# Patient Record
Sex: MALE | Race: WHITE
[De-identification: names, ages, dates, MRNs, and addresses within clinical notes are randomized per-mention and may not be internally consistent; named-entity substitution may affect disease eponyms.]

---

## 2020-10-05 ENCOUNTER — HOSPITAL ENCOUNTER (OUTPATIENT)
Dept: HOSPITAL 11 - JP.SDS | Age: 85
Discharge: HOME | End: 2020-10-05
Attending: SURGERY
Payer: MEDICARE

## 2020-10-05 VITALS — HEART RATE: 59 BPM | DIASTOLIC BLOOD PRESSURE: 74 MMHG | SYSTOLIC BLOOD PRESSURE: 123 MMHG

## 2020-10-05 DIAGNOSIS — K21.9: ICD-10-CM

## 2020-10-05 DIAGNOSIS — E11.51: ICD-10-CM

## 2020-10-05 DIAGNOSIS — I11.0: ICD-10-CM

## 2020-10-05 DIAGNOSIS — I50.9: ICD-10-CM

## 2020-10-05 DIAGNOSIS — J44.9: ICD-10-CM

## 2020-10-05 DIAGNOSIS — K57.30: Primary | ICD-10-CM

## 2020-10-05 PROCEDURE — 45330 DIAGNOSTIC SIGMOIDOSCOPY: CPT

## 2020-10-05 NOTE — OR
DATE OF PROCEDURE:  10/05/2020

 

SURGEON:  Masoud Shields MD

 

PROCEDURE:  Colonoscopy.

 

FINDINGS:

1. Consistent with previous colonoscopy performed by Dr. Flaherty (unable to advance the

    scope past 30 cm).

2. Diverticulosis.

3. No significant amount of blood.

4. Most likely etiology of bleeding at this juncture would be diverticulosis; however, the

    patient will be sent for a barium enema.

 

COMPLICATIONS:  None.

 

ASSISTANT:  None.

 

ANESTHESIA:  MAC.

 

RISKS:  Risks, benefits, alternatives, and limitations including, but not limited to

infection, bleeding, perforation, and cardiovascular risks, along with false positives and

false negatives, were explained to the patient, who wished to proceed.

 

PROCEDURE IN DETAIL:  The patient was placed in left lateral decubitus position.  Digital

rectal exam was performed without abnormality.  Scope was introduced and advanced

atraumatically to approximately 30 cm.  This was consistent with previous operative report

with the tortuosity at this location.  The scope was never blindly advanced, and the

procedure was terminated at this time.  The scope was brought back through the colon and

retroflexed.  There was no significant blood.  The patient did have diverticulosis, which

would be described as extensive, and no abnormalities on retroflexion.  The patient

tolerated the procedure well.

 

 

 

 

Masoud Shields MD

DD:  10/05/2020 08:26:17

DT:  10/05/2020 14:45:03

Job #:  [id number]

## 2020-10-29 ENCOUNTER — HOSPITAL ENCOUNTER (EMERGENCY)
Dept: HOSPITAL 11 - JP.ED | Age: 85
Discharge: NURSING, REHAB OR LONG-TERM CARE | End: 2020-10-29
Payer: MEDICARE

## 2020-10-29 VITALS — SYSTOLIC BLOOD PRESSURE: 121 MMHG | DIASTOLIC BLOOD PRESSURE: 55 MMHG | HEART RATE: 89 BPM

## 2020-10-29 DIAGNOSIS — Z86.73: ICD-10-CM

## 2020-10-29 DIAGNOSIS — M19.90: ICD-10-CM

## 2020-10-29 DIAGNOSIS — E78.00: ICD-10-CM

## 2020-10-29 DIAGNOSIS — E87.1: ICD-10-CM

## 2020-10-29 DIAGNOSIS — Z79.899: ICD-10-CM

## 2020-10-29 DIAGNOSIS — K80.50: Primary | ICD-10-CM

## 2020-10-29 DIAGNOSIS — I25.10: ICD-10-CM

## 2020-10-29 DIAGNOSIS — R79.89: ICD-10-CM

## 2020-10-29 DIAGNOSIS — Z88.1: ICD-10-CM

## 2020-10-29 DIAGNOSIS — J44.9: ICD-10-CM

## 2020-10-29 DIAGNOSIS — Z79.82: ICD-10-CM

## 2020-10-29 DIAGNOSIS — Z79.01: ICD-10-CM

## 2020-10-29 DIAGNOSIS — Z87.891: ICD-10-CM

## 2020-10-29 DIAGNOSIS — Z79.84: ICD-10-CM

## 2020-10-29 DIAGNOSIS — I50.9: ICD-10-CM

## 2020-10-29 DIAGNOSIS — Z88.8: ICD-10-CM

## 2020-10-29 DIAGNOSIS — E11.51: ICD-10-CM

## 2020-10-29 DIAGNOSIS — I11.0: ICD-10-CM

## 2020-10-29 PROCEDURE — 96365 THER/PROPH/DIAG IV INF INIT: CPT

## 2020-10-29 PROCEDURE — 96366 THER/PROPH/DIAG IV INF ADDON: CPT

## 2020-10-29 PROCEDURE — 74019 RADEX ABDOMEN 2 VIEWS: CPT

## 2020-10-29 PROCEDURE — 99285 EMERGENCY DEPT VISIT HI MDM: CPT

## 2020-10-29 PROCEDURE — 83690 ASSAY OF LIPASE: CPT

## 2020-10-29 PROCEDURE — 74176 CT ABD & PELVIS W/O CONTRAST: CPT

## 2020-10-29 PROCEDURE — 80053 COMPREHEN METABOLIC PANEL: CPT

## 2020-10-29 PROCEDURE — 36415 COLL VENOUS BLD VENIPUNCTURE: CPT

## 2020-10-29 PROCEDURE — 85027 COMPLETE CBC AUTOMATED: CPT

## 2020-10-29 NOTE — CRLCT
INDICATION: Upper abdominal pain, vomiting, elevated liver function test



TECHNIQUE: CT Abdomen and pelvis without i.v. contrast. Coronal and 

sagittal reformats were obtained.



COMPARISON: 05/06/2015



FINDINGS: 



Lower chest: Unremarkable. 



Liver: Unremarkable. 



Spleen: Unremarkable. 



Pancreas: Unremarkable. 



Gallbladder: Unremarkable. There is high-density material present within 

the distal CBD that measures 11 mm. 



Kidney: There is a cyst in the upper pole of the left kidney measuring 2.4 

cm without interval change. 



Adrenal: Unremarkable. 



Bowel: Severe diverticulosis of the descending and sigmoid colon are 

present with retention of high density oral contrast. Moderate amount of 

stool is present throughout the colon which may be due to chronic 

constipation. The appendix is normal in appearance and size. 



Vascular: A small abdominal aortic aneurysm is present measuring 3 cm and 

slightly increased from prior examination where it measured 2.7 cm. Severe 

diffuse atherosclerotic calcifications of the abdominal aorta and its 

tributaries are present. 



Lymph: Unremarkable. 



Peritoneum: Unremarkable. No pneumoperitoneum is seen. No significant 

ascites is noted. 



Pelvis: Evaluation of the pelvic soft tissues, distal ureters and osseous 

structures are limited by beam hardening artifacts from the right anatomy 

compression screw. 



Soft tissue: Unremarkable. 



Bone: Unremarkable for age. 



IMPRESSIONS: 



1. A small abdominal aortic aneurysm is present measuring 3 cm and slightly 

increased from prior examination where it measured 2.7 cm.



2. There is high-density material present within the distal CBD that 

measures 11 mm. Findings are suspicious for choledocholithiasis.



Dictated by Dain Couch MD @ 10/29/2020 4:31:15 AM



Please note that all CT scans at this facility use dose modulation, 

iterative reconstruction, and/or weight-based dosing when appropriate to 

reduce radiation dose to as low as reasonably achievable.



Dictated by: Dain Couch MD @ 10/29/2020 04:31:34



(Electronically Signed)

## 2020-10-29 NOTE — CR
Abdomen 2V AP Flat Upright

 

CLINICAL HISTORY: Upper abdominal pain

 

FINDINGS: No free air is identified. Small bowel configuration is nonacute.

There is some mild prominence of the rugal folds. There is gas and feces

throughout the colon. There is some retained barium in numerous colonic

diverticula

 

IMPRESSION: Nonacute intestinal gas pattern

 

Mild prominence of the rugal folds of the stomach. This can be seen with

gastritis. Clinical correlation necessary

## 2020-10-29 NOTE — EDM.PDOC
ED HPI GENERAL MEDICAL PROBLEM





- General


Chief Complaint: Gastrointestinal Problem


Stated Complaint: MEDICAL VIA [facility]


Time Seen by Provider: 10/29/20 02:40


Source of Information: Reports: Patient, Old Records, RN


History Limitations: Reports: No Limitations





- History of Present Illness


INITIAL COMMENTS - FREE TEXT/NARRATIVE: 





84 yo male here via EMS after he developed progressive abdominal pain tonight 

starting about 9 pm. He vomited about 500 ml of gastric contents upon arrival 

and achieved considerable relief with this. Before 9 am he was feeling quite 

well. His emesis material did not look like blood. 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 


Onset Date: 10/28/20


Onset Time: 21:00


Duration: Hour(s):, Other (was getting progressively worse until he vomited, now

sx's only partially present. )


Location: Reports: Abdomen


Quality: Reports: Pressure


Severity: Mild (now, was severe before vomiting)


Improves with: Reports: Other (vomiting)


Worsens with: Reports: Other (unknown)


Context: Reports: Other (See HPI)


Associated Symptoms: Reports: Nausea/Vomiting.  Denies: Fever/Chills


Treatments PTA: Reports: See EMS Report


  ** Upper Abdomen


Pain Score (Numeric/FACES): 6





- Related Data


                                    Allergies











Allergy/AdvReac Type Severity Reaction Status Date / Time


 


lisinopril Allergy  Swelling Verified 10/29/20 02:34


 


naproxen Allergy  Other Verified 10/29/20 02:34


 


naproxen sodium [From Aleve] Allergy  Cannot Verified 10/29/20 02:34





   Remember  











Home Meds: 


                                    Home Meds





Aspirin 81 mg PO DAILY 05/19/14 [History]


Calcium Carbonate 1,500 mg PO BIDM 05/19/14 [History]


Cholecalciferol (Vitamin D3) [Vitamin D3] 2,000 unit PO BID 05/19/14 [History]


Fluticasone Propionate [Flonase] 2 sprays [employer] DAILY 05/19/14 [History]


Metoprolol Succinate [Toprol XL] 25 mg PO DAILY 05/19/14 [History]


Omeprazole [Prilosec] 20 mg PO BID 05/19/14 [History]


atorvaSTATin [Lipitor] 20 mg PO BEDTIME 05/19/14 [History]


metFORMIN [Glucophage] 500 mg PO BIDM 05/19/14 [History]


Isosorbide Mononitrate [Imdur] 60 mg PO DAILY 07/23/18 [History]


amLODIPine Besylate [Norvasc] 5 mg PO DAILY 07/23/18 [History]


hydrALAZINE [Apresoline] 50 mg PO TID 07/23/18 [History]


Albuterol Sulfate [Proair Hfa] 2 puff IH Q6HR PRN 10/02/20 [History]


Iron 65 mg PO BID 10/02/20 [History]


Omega-3S/DHA/Epa/Fish Oil [Omega-3 Fish Oil 1,200 mg Sfgl] 1 cap PO DAILY 

10/02/20 [History]


Sotalol [Betapace, Sorine] 40 mg PO BID 10/02/20 [History]


traMADol [Ultram] 50 mg PO Q8HR PRN 10/02/20 [History]


Apixaban [Eliquis] 5 mg PO BID 10/05/20 [History]











Past Medical History


HEENT History: Reports: Cataract, Hard of Hearing


Cardiovascular History: Reports: Arrhythmia, CAD, Heart Failure, High 

Cholesterol, Hypertension, PVD, SOB on Exertion


Respiratory History: Reports: COPD


Gastrointestinal History: Reports: GERD


Genitourinary History: Reports: Prostate Disorder


Musculoskeletal History: Reports: Arthritis


Neurological History: Reports: TIA


Psychiatric History: Reports: Depression


Endocrine/Metabolic History: Reports: Diabetes, Type II


Hematologic History: Reports: Iron Deficiency


Oncologic (Cancer) History: Reports: Prostate


Dermatologic History: Reports: Other (See Below)


Other Dermatologic History: minor skin cancer behind right ear





- Infectious Disease History


Infectious Disease History: Reports: Chicken Pox, Measles, Mumps


Other Infectious Disease History: history of ecoli





- Past Surgical History


HEENT Surgical History: Reports: Detached Retina


Cardiovascular Surgical History: Reports: None


Respiratory Surgical History: Reports: None


GI Surgical History: Reports: Colonoscopy, EGD, Hernia, Abdominal, Hernia, 

Inguinal


Male  Surgical History: Reports: Prostate Biopsy, Other (See Below)


Other Male  Surgeries/Procedures: 7 days of radiation


Endocrine Surgical History: Reports: None


Neurological Surgical History: Reports: None


Musculoskeletal Surgical History: Reports: Other (See Below)


Other Musculoskeletal Surgeries/Procedures:: Right hip surgery in 2018 repair 

fracture after fall


Oncologic Surgical History: Reports: None





Social & Family History





- Family History


Family Medical History: Noncontributory





- Tobacco Use


Tobacco Use Status *Q: Former Tobacco User


Used Tobacco, but Quit: Yes


Month/Year Tobacco Last Used: 2015





- Caffeine Use


Caffeine Use: Reports: Coffee


Caffeine Use Comment: 2 cups per day





- Alcohol Use


Days Per Week of Alcohol Use: 7


Number of Drinks Per Day: 2


Total Drinks Per Week: 14





- Recreational Drug Use


Recreational Drug Use: No





ED ROS GENERAL





- Review of Systems


Review Of Systems: See Below


Constitutional: Reports: No Symptoms


HEENT: Reports: No Symptoms


Respiratory: Reports: No Symptoms


Cardiovascular: Reports: No Symptoms


GI/Abdominal: Reports: Abdominal Pain (mild upper pain persists), Nausea (upon 

arrival now gone. ), Vomiting (one large emesis of about 500 ml)


: Reports: No Symptoms


Musculoskeletal: Reports: No Symptoms


Skin: Reports: No Symptoms


Neurological: Reports: No Symptoms





ED EXAM, GI/ABD





- Physical Exam


Exam: See Below


Exam Limited By: No Limitations


General Appearance: Alert, WD/WN, No Apparent Distress, Thin


Eyes: Bilateral: Normal Appearance


Ears: Normal External Exam, Normal Canal, Hearing Grossly Normal


Nose: Normal Inspection, No Blood


Throat/Mouth: Normal Inspection, Normal Lips, Normal Oropharynx, Normal Voice, 

No Airway Compromise


Head: Atraumatic, Normocephalic


Neck: Normal Inspection


Respiratory/Chest: No Respiratory Distress, Lungs Clear, Normal Breath Sounds, 

No Accessory Muscle Use


Cardiovascular: Regular Rate, Rhythm, No Edema


GI/Abdominal Exam: Normal Bowel Sounds, Soft, No Distention, Tender (mild RUQ, 

epigastric and LUQ abdominal pain).  No: Non-Tender, Distended


Back Exam: Normal Inspection.  No: CVA Tenderness (R), CVA Tenderness (L)


Extremities: Normal Inspection, Normal Range of Motion, Non-Tender, No Pedal 

Edema


Neurological: Alert, Oriented, CN II-XII Intact, Normal Cognition, No 

Motor/Sensory Deficits


Psychiatric: Normal Affect, Normal Mood


Skin Exam: Warm, Dry, Intact, Normal Color, No Rash





Course





- Vital Signs


Text/Narrative:: 





accepted by Dr. Zurita @ [facility] @ 0640


Last Recorded V/S: 


                                Last Vital Signs











Temp  35.6 C L  10/29/20 02:30


 


Pulse  89   10/29/20 06:17


 


Resp  16   10/29/20 06:17


 


BP  121/55 L  10/29/20 06:17


 


Pulse Ox  95   10/29/20 06:17














- Orders/Labs/Meds


Orders: 


                               Active Orders 24 hr











 Category Date Time Status


 


 Abdomen 2V AP Flat Upright [CR] Stat Exams  10/29/20 02:48 Taken


 


 NS + KCl 20mEq/L [Normal Saline with 20 mEq KCl] 1,000 Med  10/29/20 03:45 

Active





 ml   





 IV ASDIRECTED   


 


 Sodium Chloride 0.9% [Saline Flush] Med  10/29/20 02:48 Active





 10 ml FLUSH ASDIRECTED PRN   


 


 Saline Lock Insert [OM.PC] Routine Oth  10/29/20 02:48 Ordered








                                Medication Orders





Potassium Chloride/Sodium Chloride (Normal Saline With 20 Meq Kcl)  1,000 mls @ 

500 mls/hr IV ASDIRECTED ZELALEM


   Last Admin: 10/29/20 03:59  Dose: 500 mls/hr


   Documented by: LACHELLE


Sodium Chloride (Saline Flush)  10 ml FLUSH ASDIRECTED PRN


   PRN Reason: Keep Vein Open


   Last Admin: 10/29/20 03:59  Dose: 10 ml


   Documented by: LACHELLE








Labs: 


                                Laboratory Tests











  10/29/20 10/29/20 Range/Units





  02:48 03:00 


 


WBC  10.2   (4.5-11.0)  K/uL


 


RBC  4.39   (4.30-5.90)  M/uL


 


Hgb  12.9   (12.0-15.0)  g/dL


 


Hct  39.4 L   (40.0-54.0)  %


 


MCV  90   (80-98)  fL


 


MCH  29   (27-31)  pg


 


MCHC  33   (32-36)  %


 


Plt Count  210   (150-400)  K/uL


 


Sodium   125 L  (140-148)  mmol/L


 


Potassium   3.5 L  (3.6-5.2)  mmol/L


 


Chloride   90 L  (100-108)  mmol/L


 


Carbon Dioxide   26  (21-32)  mmol/L


 


Anion Gap   12.5  (5.0-14.0)  mmol/L


 


BUN   17  (7-18)  mg/dL


 


Creatinine   1.0  D  (0.8-1.3)  mg/dL


 


Est Cr Clr Drug Dosing   45.39  mL/min


 


Estimated GFR (MDRD)   > 60  (>60)  


 


Glucose   178 H  ()  mg/dL


 


Calcium   9.1  D  (8.5-10.1)  mg/dL


 


Total Bilirubin   1.5 H D  (0.2-1.0)  mg/dL


 


AST   625 H D  (15-37)  U/L


 


ALT   305 H  (12-78)  U/L


 


Alkaline Phosphatase   117 H  ()  U/L


 


Total Protein   6.9  (6.4-8.2)  g/dL


 


Albumin   3.8  (3.4-5.0)  g/dL


 


Globulin   3.1  (2.3-3.5)  g/dL


 


Albumin/Globulin Ratio   1.2  (1.2-2.2)  


 


Lipase   103  ()  U/L











Meds: 


Medications











Generic Name Dose Route Start Last Admin





  Trade Name Freq  PRN Reason Stop Dose Admin


 


Potassium Chloride/Sodium Chloride  1,000 mls @ 500 mls/hr  10/29/20 03:45  

10/29/20 03:59





  Normal Saline With 20 Meq Kcl  IV   500 mls/hr





  ASDIRECTED ZELALEM   Administration


 


Sodium Chloride  10 ml  10/29/20 02:48  10/29/20 03:59





  Saline Flush  FLUSH   10 ml





  ASDIRECTED PRN   Administration





  Keep Vein Open  














Discontinued Medications














Generic Name Dose Route Start Last Admin





  Trade Name Freq  PRN Reason Stop Dose Admin


 


Sodium Chloride  70 mls @ 2.5 mls/sec  10/29/20 03:51 





  Normal Saline  IV  10/29/20 03:52 





  ASDIRECTED STA  


 


Iopamidol  100 ml  10/29/20 03:51 





  Isovue-300 (61%)  IV  10/29/20 03:52 





  .AS DIRECTED STA  














- Radiology Interpretation


Free Text/Narrative:: 





Flat/upright abdominal X-rays-neg





CT abd/pelvis-


CT Results Date: 10/29/20





Departure





- Departure


Time of Disposition: 07:00


Disposition: DC/Tfer to Acute Hospital [facility]


Condition: Fair


Clinical Impression: 


 Choledocholithiasis, Hyponatremia, Elevated LFTs








- Discharge Information


*PRESCRIPTION DRUG MONITORING PROGRAM REVIEWED*: No


*COPY OF PRESCRIPTION DRUG MONITORING REPORT IN PATIENT VANNESA: No


Referrals: 


PCP,None [Primary Care Provider] - 


Forms:  ED Department Discharge





Sepsis Event Note (ED)





- Evaluation


Sepsis Screening Result: No Definite Risk





- Focused Exam


Vital Signs: 


                                   Vital Signs











  Temp Pulse Resp BP Pulse Ox


 


 10/29/20 06:17   89  16  121/55 L  95


 


 10/29/20 05:17   83   124/50 L 


 


 10/29/20 04:18   80  16  138/52 L  95


 


 10/29/20 02:30  35.6 C L  70  18  150/63 H  88 L














- My Orders


Last 24 Hours: 


My Active Orders





10/29/20 02:48


Abdomen 2V AP Flat Upright [CR] Stat 


Sodium Chloride 0.9% [Saline Flush]   10 ml FLUSH ASDIRECTED PRN 


Saline Lock Insert [OM.PC] Routine 





10/29/20 03:45


NS + KCl 20mEq/L [Normal Saline with 20 mEq KCl] 1,000 ml IV ASDIRECTED 














- Assessment/Plan


Last 24 Hours: 


My Active Orders





10/29/20 02:48


Abdomen 2V AP Flat Upright [CR] Stat 


Sodium Chloride 0.9% [Saline Flush]   10 ml FLUSH ASDIRECTED PRN 


Saline Lock Insert [OM.PC] Routine 





10/29/20 03:45


NS + KCl 20mEq/L [Normal Saline with 20 mEq KCl] 1,000 ml IV ASDIRECTED

## 2021-04-12 ENCOUNTER — HOSPITAL ENCOUNTER (EMERGENCY)
Dept: HOSPITAL 11 - JP.ED | Age: 86
Discharge: HOME | End: 2021-04-12
Payer: MEDICARE

## 2021-04-12 VITALS — DIASTOLIC BLOOD PRESSURE: 104 MMHG | HEART RATE: 78 BPM | SYSTOLIC BLOOD PRESSURE: 170 MMHG

## 2021-04-12 DIAGNOSIS — I50.9: ICD-10-CM

## 2021-04-12 DIAGNOSIS — Z79.01: ICD-10-CM

## 2021-04-12 DIAGNOSIS — Z87.891: ICD-10-CM

## 2021-04-12 DIAGNOSIS — Z86.73: ICD-10-CM

## 2021-04-12 DIAGNOSIS — I11.0: ICD-10-CM

## 2021-04-12 DIAGNOSIS — K21.9: ICD-10-CM

## 2021-04-12 DIAGNOSIS — E78.5: ICD-10-CM

## 2021-04-12 DIAGNOSIS — Z79.899: ICD-10-CM

## 2021-04-12 DIAGNOSIS — I25.10: ICD-10-CM

## 2021-04-12 DIAGNOSIS — J44.9: ICD-10-CM

## 2021-04-12 DIAGNOSIS — D72.829: Primary | ICD-10-CM

## 2021-04-12 DIAGNOSIS — E11.9: ICD-10-CM

## 2021-04-12 DIAGNOSIS — R79.89: ICD-10-CM

## 2021-04-12 DIAGNOSIS — I48.91: ICD-10-CM

## 2021-04-12 DIAGNOSIS — E11.51: ICD-10-CM

## 2021-04-12 PROCEDURE — 85025 COMPLETE CBC W/AUTO DIFF WBC: CPT

## 2021-04-12 PROCEDURE — 80053 COMPREHEN METABOLIC PANEL: CPT

## 2021-04-12 PROCEDURE — 99284 EMERGENCY DEPT VISIT MOD MDM: CPT

## 2021-04-12 PROCEDURE — 36415 COLL VENOUS BLD VENIPUNCTURE: CPT

## 2021-04-12 PROCEDURE — 81001 URINALYSIS AUTO W/SCOPE: CPT

## 2021-04-12 PROCEDURE — 74177 CT ABD & PELVIS W/CONTRAST: CPT

## 2021-04-12 PROCEDURE — 82150 ASSAY OF AMYLASE: CPT

## 2021-04-12 PROCEDURE — 83690 ASSAY OF LIPASE: CPT

## 2021-04-12 PROCEDURE — 83735 ASSAY OF MAGNESIUM: CPT

## 2021-04-12 NOTE — EDM.PDOC
ED HPI GENERAL MEDICAL PROBLEM





- General


Chief Complaint: Gastrointestinal Problem


Stated Complaint: STOMACH PAIN


Time Seen by Provider: 04/12/21 18:13


Source of Information: Reports: Patient


History Limitations: Reports: No Limitations





- History of Present Illness


INITIAL COMMENTS - FREE TEXT/NARRATIVE: 





Patient presents to the ER due to ongoing concern about abdominal discomfort 

that occurrs mainly after lunchtime meal.  he states he eats variable foods and 

has not associated pain/discomfort with any specific food.  he states its been 

going on for several weeks (at least since mid-March).  He states no problems 

after dinner and rare occurrance after breakfast.  he states he has not seen 

anyone for this--just returned from spending the winter in [place] (back to 

local area on Friday).  He has not taken any OTC meds such as tums, pepto, MOM 

or other products for upset stomach.  He states he has chronic loose 

stools--these have somewhat improved since he was taken off his metformin.  

denies any N/V, fever/chills, lightheaded/dizzy etc.





--has appointment with PCM scheduled in 3 weeks; did not seek care while in 

[place]





COVID + 23 Jan (asymptomatic); has not recieved COVID immunization yet





PMH--HTN, DM2, HLP, A-fib, COPD, hx prostate ca (2012)


Meds--EMR reviewed as patient states he does not know


Allergies--EMR reviewed as patient states he does not know





Tob--former (quit 7 years ago)


EtOH--glass of wine nightly


Drugs--denies


  ** Abdomen


Pain Score (Numeric/FACES): 2





- Related Data


                                    Allergies











Allergy/AdvReac Type Severity Reaction Status Date / Time


 


naproxen Allergy Unknown Other Verified 04/12/21 17:32


 


naproxen sodium [From Aleve] Allergy Unknown Cannot Verified 04/12/21 17:32





   Remember  


 


lisinopril Allergy  Swelling Verified 04/12/21 17:32











Home Meds: 


                                    Home Meds





Aspirin 81 mg PO DAILY 05/19/14 [History]


Calcium Carbonate 1,500 mg PO BIDM 05/19/14 [History]


Cholecalciferol (Vitamin D3) [Vitamin D3] 2,000 unit PO BID 05/19/14 [History]


Fluticasone Propionate [Flonase] 2 sprays [employer] DAILY 05/19/14 [History]


Metoprolol Succinate [Toprol XL] 25 mg PO DAILY 05/19/14 [History]


Omeprazole [Prilosec] 20 mg PO BID 05/19/14 [History]


atorvaSTATin [Lipitor] 20 mg PO BEDTIME 05/19/14 [History]


Isosorbide Mononitrate [Imdur] 60 mg PO DAILY 07/23/18 [History]


amLODIPine Besylate [Norvasc] 5 mg PO DAILY 07/23/18 [History]


hydrALAZINE [Apresoline] 50 mg PO TID 07/23/18 [History]


Albuterol Sulfate [Proair Hfa] 2 puff IH Q6HR PRN 10/02/20 [History]


Iron 65 mg PO BID 10/02/20 [History]


Sotalol [Betapace, Sorine] 40 mg PO BID 10/02/20 [History]


traMADol [Ultram] 50 mg PO Q8HR PRN 10/02/20 [History]


Apixaban [Eliquis] 5 mg PO BID 10/05/20 [History]











Past Medical History


HEENT History: Reports: Cataract, Hard of Hearing


Cardiovascular History: Reports: Arrhythmia, CAD, Heart Failure, High 

Cholesterol, Hypertension, PVD, SOB on Exertion


Respiratory History: Reports: COPD


Gastrointestinal History: Reports: GERD


Genitourinary History: Reports: Prostate Disorder


Musculoskeletal History: Reports: Arthritis


Neurological History: Reports: TIA


Psychiatric History: Reports: Depression


Endocrine/Metabolic History: Reports: Diabetes, Type II


Hematologic History: Reports: Iron Deficiency


Oncologic (Cancer) History: Reports: Prostate


Dermatologic History: Reports: Other (See Below)


Other Dermatologic History: minor skin cancer behind right ear





- Infectious Disease History


Infectious Disease History: Reports: Chicken Pox, Measles, Mumps


Other Infectious Disease History: history of ecoli





- Past Surgical History


HEENT Surgical History: Reports: Detached Retina


Cardiovascular Surgical History: Reports: None


Respiratory Surgical History: Reports: None


GI Surgical History: Reports: Colonoscopy, EGD, Hernia, Abdominal, Hernia, 

Inguinal


Male  Surgical History: Reports: Prostate Biopsy, Other (See Below)


Other Male  Surgeries/Procedures: 7 days of radiation


Endocrine Surgical History: Reports: None


Neurological Surgical History: Reports: None


Musculoskeletal Surgical History: Reports: Other (See Below)


Other Musculoskeletal Surgeries/Procedures:: Right hip surgery in 2018 repair 

fracture after fall


Oncologic Surgical History: Reports: None





Social & Family History





- Family History


Family Medical History: No Pertinent Family History





- Tobacco Use


Tobacco Use Status *Q: Former Tobacco User


Years of Tobacco use: 55


Packs/Tins Daily: 1


Used Tobacco, but Quit: Yes


Month/Year Tobacco Last Used: Jan 2011


Second Hand Smoke Exposure: No





- Caffeine Use


Caffeine Use: Reports: Coffee


Other Caffeine Use: 2 cups daily


Caffeine Use Comment: 2 cups per day





- Alcohol Use


Days Per Week of Alcohol Use: 7


Number of Drinks Per Day: 1


Total Drinks Per Week: 7


Date of Last Drink: 04/11/21





- Recreational Drug Use


Recreational Drug Use: No





ED ROS GENERAL





- Review of Systems


Review Of Systems: See Below


Constitutional: Reports: No Symptoms.  Denies: Fever, Chills, Weakness, Fatigue,

Decreased Appetite, Weight Loss


HEENT: Reports: No Symptoms


Respiratory: Reports: No Symptoms


Cardiovascular: Reports: No Symptoms


Endocrine: Reports: No Symptoms


GI/Abdominal: Reports: Abdominal Pain, Diarrhea.  Denies: Constipation, 

Decreased Appetite, Difficulty Swallowing, Nausea, Vomiting


: Reports: No Symptoms


Musculoskeletal: Reports: No Symptoms


Skin: Reports: No Symptoms


Neurological: Reports: No Symptoms


Psychiatric: Reports: No Symptoms


Hematologic/Lymphatic: Reports: No Symptoms


Immunologic: Reports: No Symptoms





ED EXAM, GI/ABD





- Physical Exam


Exam: See Below


Exam Limited By: No Limitations


General Appearance: Alert, WD/WN, No Apparent Distress


Eyes: Bilateral: Normal Appearance, EOMI


Ears: Normal External Exam


Nose: Normal Inspection


Throat/Mouth: Normal Inspection, Normal Lips, Normal Oropharynx, Normal Voice


Head: Atraumatic, Normocephalic


Neck: Normal Inspection, Supple, Non-Tender, Full Range of Motion.  No: L

ymphadenopathy (R), Lymphadenopathy (L)


Respiratory/Chest: No Respiratory Distress, Lungs Clear, Normal Breath Sounds, 

No Accessory Muscle Use


Cardiovascular: Normal Peripheral Pulses, Regular Rate, Rhythm, No Murmur.  No: 

No Edema


GI/Abdominal Exam: Normal Bowel Sounds, Soft, Tender (mild tenderness--he states

primarily left sided).  No: No Distention, Distended, Guarding, Rigid, Rebound


 (Male) Exam: Deferred


Rectal (Males) Exam: Deferred


Back Exam: Full Range of Motion (mild kyphosis)


Extremities: Normal Inspection, Normal Range of Motion, No Pedal Edema, Normal 

Capillary Refill


Neurological: Alert, Oriented, Normal Cognition


Psychiatric: Normal Affect, Normal Mood


Skin Exam: Warm, Dry, Intact, Normal Color





Course





- Vital Signs


Text/Narrative:: 





2055--reviewed labs, noted for elevated WBC-15, 87%; LFTs--AST/ALT-517/290, ALP-

286, T.bili-1.7; normal amylase/lipase-38/84. mild hypomag-1.6.  d/w patient 

abnormal findings and next step is to obtain CT abd/pelvis for further 

evaluation.  verbalized understanding/agreement





2325--CT abd/pelvis report has returned, no acute findings; review of EMR noted 

for elevated LFTs previoulsy.  UA unremarkable.  no explanation for today's 

complaints nor for mild leukocytosis.  will treat with oral magnesium x 1 and 

have patient contact PCM for ER follow up in the next 3-5 days.  due to 

abdominal pain after noon meal will start protonix to see if any change in 

symptoms that can be followed up with PCM.  patient verbalized u

nderstanding/agreement with plan of care.  ready for d/c


Last Recorded V/S: 


                                Last Vital Signs











Temp  97 F   04/12/21 17:29


 


Pulse  78   04/12/21 20:56


 


Resp  16   04/12/21 20:56


 


BP  170/104 H  04/12/21 20:56


 


Pulse Ox  96   04/12/21 20:56














- Orders/Labs/Meds


Orders: 


                               Active Orders 24 hr











 Category Date Time Status


 


 Iopamidol [Isovue-300 (61%)] Med  04/12/21 21:15 Active





 100 ml IV .AS DIRECTED   


 


 Sodium Chloride 0.9% [Normal Saline] 75 ml Med  04/12/21 21:15 Active





 IV ASDIRECTED   


 


 Sodium Chloride 0.9% [Saline Flush] Med  04/12/21 20:53 Active





 10 ml FLUSH ASDIRECTED PRN   


 


 ED Antiemetic Medication Reflex [OM.PC] Click to Edit Oth  04/12/21 18:37 

Ordered


 


 ED GI Medications Reflex [OM.PC] Click to Edit Oth  04/12/21 18:37 Ordered


 


 Saline Lock Insert [OM.PC] Routine Oth  04/12/21 20:53 Ordered








                                Medication Orders





Sodium Chloride (Normal Saline)  75 mls @ 3 mls/sec IV ASDIRECTED ZELALEM


   Last Admin: 04/12/21 21:50  Dose: 3 mls/sec


   Documented by: THELASH


Iopamidol (Iopamidol 612 Mg/Ml 100 Ml Bottle)  100 ml IV .AS DIRECTED ZELALEM


   Last Admin: 04/12/21 21:50  Dose: 88 ml


   Documented by: THELASH


Sodium Chloride (Sodium Chloride 0.9% 10 Ml Syringe)  10 ml FLUSH ASDIRECTED PRN


   PRN Reason: Keep Vein Open


   Last Admin: 04/12/21 21:07  Dose: 10 ml


   Documented by: PREILOR








Labs: 


                                Laboratory Tests











  04/12/21 04/12/21 04/12/21 Range/Units





  18:51 18:51 20:56 


 


WBC  15.0 H    (4.5-11.0)  K/uL


 


RBC  4.39    (4.30-5.90)  M/uL


 


Hgb  13.1    (12.0-15.0)  g/dL


 


Hct  39.1 L    (40.0-54.0)  %


 


MCV  89    (80-98)  fL


 


MCH  30    (27-31)  pg


 


MCHC  34    (32-36)  %


 


Plt Count  248    (150-400)  K/uL


 


Neut % (Auto)  87 H    (36-66)  %


 


Lymph % (Auto)  3 L    (24-44)  %


 


Mono % (Auto)  10 H    (2-6)  %


 


Eos % (Auto)  0 L    (2-4)  %


 


Baso % (Auto)  0    (0-1)  %


 


Sodium   139 L   (140-148)  mmol/L


 


Potassium   4.1   (3.6-5.2)  mmol/L


 


Chloride   100   (100-108)  mmol/L


 


Carbon Dioxide   29   (21-32)  mmol/L


 


Anion Gap   14.1 H   (5.0-14.0)  mmol/L


 


BUN   14   (7-18)  mg/dL


 


Creatinine   0.9   (0.8-1.3)  mg/dL


 


Est Cr Clr Drug Dosing   52.98   mL/min


 


Estimated GFR (MDRD)   > 60   (>60)  


 


Glucose   169 H   ()  mg/dL


 


Calcium   9.0   (8.5-10.1)  mg/dL


 


Magnesium   1.6 L   (1.8-2.4)  mg/dL


 


Total Bilirubin   1.7 H   (0.2-1.0)  mg/dL


 


AST   517 H   (15-37)  U/L


 


ALT   290 H   (12-78)  U/L


 


Alkaline Phosphatase   286 H D   ()  U/L


 


Total Protein   6.5   (6.4-8.2)  g/dL


 


Albumin   3.3 L   (3.4-5.0)  g/dL


 


Globulin   3.2   (2.3-3.5)  g/dL


 


Albumin/Globulin Ratio   1.0 L   (1.2-2.2)  


 


Amylase   38   ()  U/L


 


Lipase   84   ()  U/L


 


Urine Color    Yellow  (YELLOW)  


 


Urine Appearance    Clear  (CLEAR)  


 


Urine pH    6.5  (5.0-8.0)  


 


Ur Specific Gravity    1.025  (1.008-1.030)  


 


Urine Protein    Negative  (NEGATIVE)  mg/dL


 


Urine Glucose (UA)    100 H  (NEGATIVE)  mg/dL


 


Urine Ketones    Negative  (NEGATIVE)  mg/dL


 


Urine Occult Blood    Negative  (NEGATIVE)  


 


Urine Nitrite    Negative  (NEGATIVE)  


 


Urine Bilirubin    Small H  (NEGATIVE)  


 


Urine Urobilinogen    4.0 H  (0.2-1.0)  EU/dL


 


Ur Leukocyte Esterase    Negative  (NEGATIVE)  


 


Urine RBC    0-5  (0-5)  


 


Urine WBC    0-5  (0-5)  


 


Ur Epithelial Cells    Not seen  


 


Amorphous Sediment    Not seen  


 


Urine Bacteria    Not seen  


 


Urine Mucus    Not seen  











Meds: 


Medications











Generic Name Dose Route Start Last Admin





  Trade Name Freq  PRN Reason Stop Dose Admin


 


Sodium Chloride  75 mls @ 3 mls/sec  04/12/21 21:15  04/12/21 21:50





  Normal Saline  IV   3 mls/sec





  ASDIRECTED ZELALEM   Administration


 


Iopamidol  100 ml  04/12/21 21:15  04/12/21 21:50





  Iopamidol 612 Mg/Ml 100 Ml Bottle  IV   88 ml





  .AS DIRECTED ZELALEM   Administration


 


Sodium Chloride  10 ml  04/12/21 20:53  04/12/21 21:07





  Sodium Chloride 0.9% 10 Ml Syringe  FLUSH   10 ml





  ASDIRECTED PRN   Administration





  Keep Vein Open  














Discontinued Medications














Generic Name Dose Route Start Last Admin





  Trade Name Freq  PRN Reason Stop Dose Admin


 


Pantoprazole Sodium  40 mg  04/12/21 18:37  04/12/21 19:01





  Pantoprazole 40 Mg Tab.Cr  PO  04/12/21 18:38  40 mg





  ONETIME ONE   Administration


 


Sodium Chloride  10 ml  04/12/21 21:15  04/12/21 21:50





  Sodium Chloride 0.9% 10 Ml Syringe  FLUSH  04/12/21 21:16  10 ml





  ONETIME ONE   Administration














- Radiology Interpretation


Free Text/Narrative:: 





CT abd/pelvis with contrast--


1. no acute abnormality identified, no cause for patient's symptoms is 

demonstrated


2. multiple nonacute findings as per detailed report


CT Results Date: 04/12/21 (faxed report recieved [id number])





Departure





- Departure


Time of Disposition: 23:29


Disposition: Home, Self-Care 01


Clinical Impression: 


 Abdominal pain, Leukocytosis, Elevated LFTs








- Discharge Information


*PRESCRIPTION DRUG MONITORING PROGRAM REVIEWED*: Not Applicable


*COPY OF PRESCRIPTION DRUG MONITORING REPORT IN PATIENT VANNESA: Not Applicable


Instructions:  Abdominal Pain, Adult, Easy-to-Read


Referrals: 


Mike Montenegro MD [Primary Care Provider] - 


Forms:  ED Department Discharge


Additional Instructions: 


Contact your family doctor tomorrow and schedule ER follow up in the next 3-5 

days





Please ensure you are drinking plenty of fluids--water/juice/sports drinks of 

choice to stay well hydrated--especially after receiving contrast dye for CT 

scan today in the ER as its important to flush this out of your system





You have been given a prescription for a new medication--pantanozole (Protonix):

this medication should be taken on an empty stomach first thing in the morning, 

wait 30 minutes before eating after taking this medication for best results





Sepsis Event Note (ED)





- Evaluation


Sepsis Screening Result: No Definite Risk





- Focused Exam


Vital Signs: 


                                   Vital Signs











  Temp Pulse Resp BP Pulse Ox


 


 04/12/21 20:56   78  16  170/104 H  96


 


 04/12/21 17:48   74  18  154/66 H  96


 


 04/12/21 17:29  97 F  82  18  186/88 H  96














- My Orders


Last 24 Hours: 


My Active Orders





04/12/21 18:37


ED Antiemetic Medication Reflex [OM.PC] Click to Edit 


ED GI Medications Reflex [OM.PC] Click to Edit 





04/12/21 20:53


Sodium Chloride 0.9% [Saline Flush]   10 ml FLUSH ASDIRECTED PRN 


Saline Lock Insert [OM.PC] Routine 





04/12/21 21:15


Iopamidol [Isovue-300 (61%)]   100 ml IV .AS DIRECTED 


Sodium Chloride 0.9% [Normal Saline] 75 ml IV ASDIRECTED 














- Assessment/Plan


Last 24 Hours: 


My Active Orders





04/12/21 18:37


ED Antiemetic Medication Reflex [OM.PC] Click to Edit 


ED GI Medications Reflex [OM.PC] Click to Edit 





04/12/21 20:53


Sodium Chloride 0.9% [Saline Flush]   10 ml FLUSH ASDIRECTED PRN 


Saline Lock Insert [OM.PC] Routine 





04/12/21 21:15


Iopamidol [Isovue-300 (61%)]   100 ml IV .AS DIRECTED 


Sodium Chloride 0.9% [Normal Saline] 75 ml IV ASDIRECTED

## 2021-04-12 NOTE — CRLCT
INDICATION: Abdominal pain. Leukocytosis and abnormal LFTs. 



CT ABDOMEN AND PELVIS WITH CONTRAST



TECHNIQUE:  Multidetector CT imaging was performed through the abdomen and 

pelvis following intravenous contrast administration using 88 mL 

Isovue-300. Coronal and sagittal reconstructions were generated.



COMPARISON:  10/29/2020 CT abdomen and pelvis.



FINDINGS:



Lower chest:  Mild bibasilar lung fibrosis, similar to the previous exam.



Liver:  Normal hepatic size and contour with no focal liver lesion 

identified. 



Gallbladder and bile ducts:  Interval cholecystectomy. Improvement in 

previously-seen biliary dilation. The density seen previously in the distal 

common bile duct, which may have represented an intraductal stone, is no 

longer visualized.



Pancreas:  Unremarkable.



Spleen:  Unremarkable aside from calcified granulomas.



Adrenals:  No nodules or masses.



Kidneys, ureters, and urinary bladder:  Unchanged left renal cyst. No 

hydronephrosis involving either kidney. Diffuse mild wall thickening of the 

urinary bladder, similar to the previous exam allowing for differences in 

bladder distension.



Gastrointestinal tract:  Normal caliber small bowel without definite 

obstruction or wall thickening.  The appendix is normal. There is extensive 

colonic diverticulosis, without evidence of diverticulitis.



Vascular structures:  Diffuse atherosclerotic calcification of the 

abdominal aorta and its major branches. No significant change in mild 

dilation of the abdominal aorta just below the renal arteries, measuring 

3.1 centimeters in transverse diameter. More inferiorly, the abdominal 

aorta measures 3.0 centimeters in diameter, also stable. A stent within the 

distal abdominal aorta appears unchanged.



Peritoneum:  No free air, abscess, or significant free fluid.



Lymph nodes:  No pathologically enlarged nodes identified.



Reproductive organs:  Upper normal prostate size. Unchanged metallic 

densities within the prostate which may represent radioactive seed 

implants.



Bones:  Spinal degenerative changes. Right femoral neck screw and 

interlocking intramedullary pattie extending into the femoral shaft.



IMPRESSION:



1. No acute abnormality identified.  No cause for the patient`s symptoms is 

demonstrated.



2. Multiple nonacute findings as detailed above. 



ABBY CONNER MD



[facility]



Dictated by Baljinder Conner MD @ 4/12/2021 11:10:31 PM



Dictated by: Baljinder Conner MD @ 04/12/2021 23:14:37



(Electronically Signed)

## 2021-04-13 ENCOUNTER — HOSPITAL ENCOUNTER (INPATIENT)
Dept: HOSPITAL 11 - JP.ED | Age: 86
LOS: 3 days | Discharge: HOME | DRG: 368 | End: 2021-04-16
Attending: INTERNAL MEDICINE | Admitting: INTERNAL MEDICINE
Payer: MEDICARE

## 2021-04-13 DIAGNOSIS — H91.90: ICD-10-CM

## 2021-04-13 DIAGNOSIS — D50.9: ICD-10-CM

## 2021-04-13 DIAGNOSIS — I48.20: ICD-10-CM

## 2021-04-13 DIAGNOSIS — J44.9: ICD-10-CM

## 2021-04-13 DIAGNOSIS — K31.9: ICD-10-CM

## 2021-04-13 DIAGNOSIS — N42.9: ICD-10-CM

## 2021-04-13 DIAGNOSIS — B37.81: ICD-10-CM

## 2021-04-13 DIAGNOSIS — F32.9: ICD-10-CM

## 2021-04-13 DIAGNOSIS — Z87.891: ICD-10-CM

## 2021-04-13 DIAGNOSIS — E61.1: ICD-10-CM

## 2021-04-13 DIAGNOSIS — Z98.890: ICD-10-CM

## 2021-04-13 DIAGNOSIS — K92.1: ICD-10-CM

## 2021-04-13 DIAGNOSIS — E11.9: ICD-10-CM

## 2021-04-13 DIAGNOSIS — D62: ICD-10-CM

## 2021-04-13 DIAGNOSIS — Z79.82: ICD-10-CM

## 2021-04-13 DIAGNOSIS — I50.9: ICD-10-CM

## 2021-04-13 DIAGNOSIS — I11.0: ICD-10-CM

## 2021-04-13 DIAGNOSIS — Z79.01: ICD-10-CM

## 2021-04-13 DIAGNOSIS — K63.5: ICD-10-CM

## 2021-04-13 DIAGNOSIS — I25.10: ICD-10-CM

## 2021-04-13 DIAGNOSIS — M19.90: ICD-10-CM

## 2021-04-13 DIAGNOSIS — Z88.8: ICD-10-CM

## 2021-04-13 DIAGNOSIS — D64.9: ICD-10-CM

## 2021-04-13 DIAGNOSIS — K57.31: ICD-10-CM

## 2021-04-13 DIAGNOSIS — R79.89: ICD-10-CM

## 2021-04-13 DIAGNOSIS — K20.91: Primary | ICD-10-CM

## 2021-04-13 DIAGNOSIS — K44.9: ICD-10-CM

## 2021-04-13 DIAGNOSIS — K21.9: ICD-10-CM

## 2021-04-13 DIAGNOSIS — Z85.828: ICD-10-CM

## 2021-04-13 DIAGNOSIS — Z20.822: ICD-10-CM

## 2021-04-13 DIAGNOSIS — Z88.6: ICD-10-CM

## 2021-04-13 DIAGNOSIS — E78.00: ICD-10-CM

## 2021-04-13 DIAGNOSIS — Z86.73: ICD-10-CM

## 2021-04-13 DIAGNOSIS — K57.91: ICD-10-CM

## 2021-04-13 DIAGNOSIS — Z79.899: ICD-10-CM

## 2021-04-13 LAB — SARS-COV-2 RNA RESP QL NAA+PROBE: NEGATIVE

## 2021-04-13 PROCEDURE — 88313 SPECIAL STAINS GROUP 2: CPT

## 2021-04-13 PROCEDURE — 84100 ASSAY OF PHOSPHORUS: CPT

## 2021-04-13 PROCEDURE — 36415 COLL VENOUS BLD VENIPUNCTURE: CPT

## 2021-04-13 PROCEDURE — 99285 EMERGENCY DEPT VISIT HI MDM: CPT

## 2021-04-13 PROCEDURE — 88305 TISSUE EXAM BY PATHOLOGIST: CPT

## 2021-04-13 PROCEDURE — 85027 COMPLETE CBC AUTOMATED: CPT

## 2021-04-13 PROCEDURE — 87081 CULTURE SCREEN ONLY: CPT

## 2021-04-13 PROCEDURE — 82962 GLUCOSE BLOOD TEST: CPT

## 2021-04-13 PROCEDURE — 99222 1ST HOSP IP/OBS MODERATE 55: CPT

## 2021-04-13 PROCEDURE — 86850 RBC ANTIBODY SCREEN: CPT

## 2021-04-13 PROCEDURE — 85610 PROTHROMBIN TIME: CPT

## 2021-04-13 PROCEDURE — 86901 BLOOD TYPING SEROLOGIC RH(D): CPT

## 2021-04-13 PROCEDURE — 80053 COMPREHEN METABOLIC PANEL: CPT

## 2021-04-13 PROCEDURE — 82947 ASSAY GLUCOSE BLOOD QUANT: CPT

## 2021-04-13 PROCEDURE — C9113 INJ PANTOPRAZOLE SODIUM, VIA: HCPCS

## 2021-04-13 PROCEDURE — 86900 BLOOD TYPING SEROLOGIC ABO: CPT

## 2021-04-13 PROCEDURE — 99238 HOSP IP/OBS DSCHRG MGMT 30/<: CPT

## 2021-04-13 PROCEDURE — 0241U: CPT

## 2021-04-13 PROCEDURE — 94762 N-INVAS EAR/PLS OXIMTRY CONT: CPT

## 2021-04-13 PROCEDURE — 85018 HEMOGLOBIN: CPT

## 2021-04-13 PROCEDURE — 83880 ASSAY OF NATRIURETIC PEPTIDE: CPT

## 2021-04-13 PROCEDURE — 85025 COMPLETE CBC W/AUTO DIFF WBC: CPT

## 2021-04-13 PROCEDURE — 82272 OCCULT BLD FECES 1-3 TESTS: CPT

## 2021-04-13 PROCEDURE — 99232 SBSQ HOSP IP/OBS MODERATE 35: CPT

## 2021-04-13 PROCEDURE — 83735 ASSAY OF MAGNESIUM: CPT

## 2021-04-13 PROCEDURE — 74177 CT ABD & PELVIS W/CONTRAST: CPT

## 2021-04-13 PROCEDURE — 85730 THROMBOPLASTIN TIME PARTIAL: CPT

## 2021-04-13 NOTE — EDM.PDOC
ED HPI GENERAL MEDICAL PROBLEM





- General


Chief Complaint: Gastrointestinal Problem


Stated Complaint: RECTAL BLEEDING


Time Seen by Provider: 04/13/21 20:58


Source of Information: Reports: Patient


History Limitations: Reports: No Limitations





- History of Present Illness


INITIAL COMMENTS - FREE TEXT/NARRATIVE: 





Presents emergency room today secondary to concern about bloody diarrhea he 

states that he felt fine today actually had no abdominal pain as reported 

yesterday when I saw him in the emergency room please refer to that visit for 

full details.  Dates that around 3 PM he had black splatter bloody stool he 

called his PCM he was instructed to watch and keep his appointment as scheduled 

on Thursday.  he had a repeat episode around 1700 that he reports as being just 

bloody stool in nature bright red blood he did take pictures of this and did 

show them to this physician from his phone he denies any lightheaded dizziness 

abdominal pain nausea vomiting discomfort otherwise.





Patient is  and lives by himself is returned from [place] where he 

[place]





Medical history medications allergies no history are all reviewed there are no 

updates or changes to yesterday's encounter


  ** Right Flank


Pain Score (Numeric/FACES): 1





- Related Data


                                    Allergies











Allergy/AdvReac Type Severity Reaction Status Date / Time


 


naproxen Allergy Unknown Other Verified 04/13/21 20:45


 


naproxen sodium [From Aleve] Allergy Unknown Cannot Verified 04/13/21 20:45





   Remember  


 


lisinopril Allergy  Swelling Verified 04/13/21 20:45











Home Meds: 


                                    Home Meds





Aspirin 81 mg PO BEDTIME 05/19/14 [History]


Calcium Carbonate 1,500 mg PO BIDM 05/19/14 [History]


Cholecalciferol (Vitamin D3) [Vitamin D3] 2,000 unit PO BID 05/19/14 [History]


Fluticasone Propionate [Flonase] 2 sprays [employer] DAILY 05/19/14 [History]


Metoprolol Succinate [Toprol XL] 25 mg PO DAILY 05/19/14 [History]


Omeprazole [Prilosec] 20 mg PO BID 05/19/14 [History]


atorvaSTATin [Lipitor] 20 mg PO BEDTIME 05/19/14 [History]


Isosorbide Mononitrate [Imdur] 60 mg PO DAILY 07/23/18 [History]


amLODIPine Besylate [Norvasc] 5 mg PO DAILY 07/23/18 [History]


hydrALAZINE [Apresoline] 50 mg PO TID 07/23/18 [History]


Albuterol Sulfate [Proair Hfa] 2 puff I[facility] Q6HR PRN 10/02/20 [History]


Iron 65 mg PO BID 10/02/20 [History]


Sotalol [Betapace, Sorine] 40 mg PO BID 10/02/20 [History]


traMADol [Ultram] 50 mg PO Q8HR PRN 10/02/20 [History]


Apixaban [Eliquis] 5 mg PO BID 10/05/20 [History]











Past Medical History


HEENT History: Reports: Cataract, Hard of Hearing


Cardiovascular History: Reports: Arrhythmia, CAD, Heart Failure, High 

Cholesterol, Hypertension, PVD, SOB on Exertion


Respiratory History: Reports: COPD


Gastrointestinal History: Reports: GERD


Genitourinary History: Reports: Prostate Disorder


Musculoskeletal History: Reports: Arthritis


Neurological History: Reports: TIA


Psychiatric History: Reports: Depression


Endocrine/Metabolic History: Reports: Diabetes, Type II


Hematologic History: Reports: Iron Deficiency


Oncologic (Cancer) History: Reports: Prostate


Dermatologic History: Reports: Other (See Below)


Other Dermatologic History: minor skin cancer behind right ear





- Infectious Disease History


Infectious Disease History: Reports: Chicken Pox, Measles, Mumps


Other Infectious Disease History: history of ecoli





- Past Surgical History


HEENT Surgical History: Reports: Detached Retina


Cardiovascular Surgical History: Reports: None


Respiratory Surgical History: Reports: None


GI Surgical History: Reports: Colonoscopy, EGD, Hernia, Abdominal, Hernia, 

Inguinal


Male  Surgical History: Reports: Prostate Biopsy, Other (See Below)


Other Male  Surgeries/Procedures: 7 days of radiation


Endocrine Surgical History: Reports: None


Neurological Surgical History: Reports: None


Musculoskeletal Surgical History: Reports: Other (See Below)


Other Musculoskeletal Surgeries/Procedures:: Right hip surgery in 2018 repair 

fracture after fall


Oncologic Surgical History: Reports: None





Social & Family History





- Family History


Family Medical History: No Pertinent Family History





- Tobacco Use


Tobacco Use Status *Q: Former Tobacco User


Years of Tobacco use: 55


Packs/Tins Daily: 1


Used Tobacco, but Quit: Yes


Month/Year Tobacco Last Used: 2011





- Caffeine Use


Caffeine Use: Reports: Coffee


Other Caffeine Use: 2 cups daily


Caffeine Use Comment: 2 cups





- Alcohol Use


Date of Last Drink: 04/13/21





- Recreational Drug Use


Recreational Drug Use: No





ED ROS GENERAL





- Review of Systems


Review Of Systems: See Below


Constitutional: Reports: No Symptoms


HEENT: Reports: No Symptoms


Respiratory: Reports: No Symptoms


Cardiovascular: Reports: No Symptoms


Endocrine: Reports: No Symptoms


GI/Abdominal: Reports: Bloody Stool


: Reports: No Symptoms


Musculoskeletal: Reports: No Symptoms


Skin: Reports: No Symptoms


Neurological: Reports: No Symptoms


Psychiatric: Reports: No Symptoms


Hematologic/Lymphatic: Reports: No Symptoms


Immunologic: Reports: No Symptoms





ED EXAM, GENERAL





- Physical Exam


Exam: See Below


Exam Limited By: No Limitations


General Appearance: Alert, WD/WN, No Apparent Distress


Eye Exam: Bilateral Eye: EOMI, Normal Inspection, PERRL


Ears: Normal External Exam


Nose: Normal Inspection


Throat/Mouth: Normal Inspection, Normal Lips, Normal Oropharynx


Head: Atraumatic, Normocephalic


Neck: Normal Inspection, Supple, Non-Tender, Full Range of Motion


Respiratory/Chest: No Respiratory Distress, Lungs Clear, Normal Breath Sounds


Cardiovascular: Normal Peripheral Pulses, Regular Rate, Rhythm, No Edema, No 

Murmur


Peripheral Pulses: 2+: Radial (L), Radial (R)


GI/Abdominal: Normal Bowel Sounds, Soft, Non-Tender, No Distention


 (Male) Exam: Deferred


Rectal (Males) Exam: Deferred


Back Exam: Other (mild kyphosis)


Extremities: Normal Inspection, Normal Range of Motion, No Pedal Edema, Normal 

Capillary Refill


Neurological: Alert, Oriented, Normal Cognition, No Motor/Sensory Deficits


Psychiatric: Normal Affect, Normal Mood


Skin Exam: Warm, Dry, Intact, Jaundice (mild jaundice appearance, liza cheeks)





Course





- Vital Signs


Text/Narrative:: 





2150--discussed with patient today's ER findings to include mild drop in blood 

count H&H yesterday as patient was seen in the emergency room by this physician 

was 13.1/39 today is noted to be 11.5/34; patient denies any lightheaded or 

dizziness or abdominal pain platelet asymptomatic he has had 3 episodes as 

reported to at home and 1 in the emergency room at this time recommend admission

 for further surgical endoscopy evaluation patient verbalized understanding 

agree with plan of care





2105--Kaylin Plata NP Nocturnist in ER; d/w her patient for admission. accepts

 at this time





2106--call placed to Dr Hernandes, Gen Surgeon to discuss case; he recommends 

hospitalist admit/he will consult.  Upper/lower endoscopy to be completed 

tomorrow.  Nocturnist updated


Last Recorded V/S: 


                                Last Vital Signs











Temp  98.0 F   04/13/21 20:43


 


Pulse  70   04/13/21 21:41


 


Resp  18   04/13/21 21:41


 


BP  154/58 H  04/13/21 21:41


 


Pulse Ox  97   04/13/21 21:41














- Orders/Labs/Meds


Orders: 


                               Active Orders 24 hr











 Category Date Time Status


 


 Cardiac Monitoring [RC] .As Directed Care  04/13/21 21:00 Active


 


 Overnight Pulse Oximetry [RC] Click to Edit Care  04/13/21 21:00 Active


 


 Peripheral IV Care [RC] .AS DIRECTED Care  04/13/21 20:59 Active


 


 Nothing per Oral Now Diet [DIET] Diet  04/13/21 Dinner Active


 


 OCCULT BLOOD SCREEN [OP] Urgent Lab  04/13/21 20:58 Ordered


 


 TYPE AND SCREEN [BBK] Urgent Lab  04/13/21 21:15 Received


 


 Sodium Chloride 0.9% [Saline Flush] Med  04/13/21 20:58 Active





 10 ml FLUSH ASDIRECTED PRN   


 


 Peripheral IV Insertion Adult [OM.PC] Urgent Oth  04/13/21 20:58 Ordered


 


 Pulse Oximetry Continuous Monitoring [OM.PC] Routine Oth  04/13/21 21:00 

Ordered








                                Medication Orders





Sodium Chloride (Sodium Chloride 0.9% 10 Ml Syringe)  10 ml FLUSH ASDIRECTED PRN


   PRN Reason: Keep Vein Open


   Last Admin: 04/13/21 21:37  Dose: 10 ml


   Documented by: PREILOR








Labs: 


                                Laboratory Tests











  04/13/21 04/13/21 04/13/21 Range/Units





  21:15 21:15 21:15 


 


WBC  8.3    (4.5-11.0)  K/uL


 


RBC  3.90 L    (4.30-5.90)  M/uL


 


Hgb  11.5 L    (12.0-15.0)  g/dL


 


Hct  34.8 L    (40.0-54.0)  %


 


MCV  89    (80-98)  fL


 


MCH  30    (27-31)  pg


 


MCHC  33    (32-36)  %


 


Plt Count  202    (150-400)  K/uL


 


Neut % (Auto)  76 H    (36-66)  %


 


Lymph % (Auto)  11 L    (24-44)  %


 


Mono % (Auto)  11 H    (2-6)  %


 


Eos % (Auto)  2    (2-4)  %


 


Baso % (Auto)  0    (0-1)  %


 


PT   12.0   (9.5-12.0)  sec


 


INR   1.10   (0.80-1.20)  


 


APTT   28.6   (27.0-36.0)  sec


 


Sodium    135 L  (140-148)  mmol/L


 


Potassium    3.7  (3.6-5.2)  mmol/L


 


Chloride    97 L  (100-108)  mmol/L


 


Carbon Dioxide    25  (21-32)  mmol/L


 


Anion Gap    16.7 H  (5.0-14.0)  mmol/L


 


BUN    11  (7-18)  mg/dL


 


Creatinine    0.8  (0.8-1.3)  mg/dL


 


Est Cr Clr Drug Dosing    59.20  mL/min


 


Estimated GFR (MDRD)    > 60  (>60)  


 


Glucose    139 H  ()  mg/dL


 


Calcium    8.9  (8.5-10.1)  mg/dL


 


Total Bilirubin    1.3 H  (0.2-1.0)  mg/dL


 


AST    142 H  (15-37)  U/L


 


ALT    216 H  (12-78)  U/L


 


Alkaline Phosphatase    264 H  ()  U/L


 


Total Protein    5.9 L  (6.4-8.2)  g/dL


 


Albumin    2.9 L  (3.4-5.0)  g/dL


 


Globulin    3.0  (2.3-3.5)  g/dL


 


Albumin/Globulin Ratio    1.0 L  (1.2-2.2)  











Meds: 


Medications











Generic Name Dose Route Start Last Admin





  Trade Name Freq  PRN Reason Stop Dose Admin


 


Sodium Chloride  10 ml  04/13/21 20:58  04/13/21 21:37





  Sodium Chloride 0.9% 10 Ml Syringe  FLUSH   10 ml





  ASDIRECTED PRN   Administration





  Keep Vein Open  














Departure





- Departure


Time of Disposition: 22:16


Disposition: Admitted As Inpatient 66


Condition: Good


Clinical Impression: 


 Lower gastrointestinal bleeding, Mild anemia, LFT elevation, Diverticulosis of 

colon with hemorrhage








- Discharge Information


*PRESCRIPTION DRUG MONITORING PROGRAM REVIEWED*: Not Applicable


*COPY OF PRESCRIPTION DRUG MONITORING REPORT IN PATIENT [facility]: Not Applicable


Referrals: 


Mike Montenegro MD [Primary Care Provider] - 


Forms:  ED Department Discharge





Sepsis Event Note (ED)





- Evaluation


Sepsis Screening Result: No Definite Risk





- Focused Exam


Vital Signs: 


                                   Vital Signs











  Temp Pulse Resp BP Pulse Ox


 


 04/13/21 21:41   70  18  154/58 H  97


 


 04/13/21 20:43  98.0 F  76  20  163/81 H  98














- My Orders


Last 24 Hours: 


My Active Orders





04/13/21 Dinner


Nothing per Oral Now Diet [DIET] 





04/13/21 20:58


OCCULT BLOOD SCREEN [OP] Urgent 


Sodium Chloride 0.9% [Saline Flush]   10 ml FLUSH ASDIRECTED PRN 


Peripheral IV Insertion Adult [OM.PC] Urgent 





04/13/21 20:59


Peripheral IV Care [RC] .AS DIRECTED 





04/13/21 21:00


Cardiac Monitoring [RC] .As Directed 


Overnight Pulse Oximetry [RC] Click to Edit 


Pulse Oximetry Continuous Monitoring [OM.PC] Routine 





04/13/21 21:15


TYPE AND SCREEN [BBK] Urgent 














- Assessment/Plan


Last 24 Hours: 


My Active Orders





04/13/21 Dinner


Nothing per Oral Now Diet [DIET] 





04/13/21 20:58


OCCULT BLOOD SCREEN [OP] Urgent 


Sodium Chloride 0.9% [Saline Flush]   10 ml FLUSH ASDIRECTED PRN 


Peripheral IV Insertion Adult [OM.PC] Urgent 





04/13/21 20:59


Peripheral IV Care [RC] .AS DIRECTED 





04/13/21 21:00


Cardiac Monitoring [RC] .As Directed 


Overnight Pulse Oximetry [RC] Click to Edit 


Pulse Oximetry Continuous Monitoring [OM.PC] Routine 





04/13/21 21:15


TYPE AND SCREEN [BBK] Urgent

## 2021-04-14 PROCEDURE — 0DB78ZX EXCISION OF STOMACH, PYLORUS, VIA NATURAL OR ARTIFICIAL OPENING ENDOSCOPIC, DIAGNOSTIC: ICD-10-PCS | Performed by: SURGERY

## 2021-04-14 PROCEDURE — 0DB68ZX EXCISION OF STOMACH, VIA NATURAL OR ARTIFICIAL OPENING ENDOSCOPIC, DIAGNOSTIC: ICD-10-PCS | Performed by: SURGERY

## 2021-04-14 RX ADMIN — SODIUM CHLORIDE SCH MLS/HR: 9 INJECTION, SOLUTION INTRAVENOUS at 02:02

## 2021-04-14 RX ADMIN — NYSTATIN SCH ML: 500000 SUSPENSION ORAL at 17:28

## 2021-04-14 RX ADMIN — NYSTATIN SCH ML: 500000 SUSPENSION ORAL at 22:05

## 2021-04-14 RX ADMIN — SODIUM CHLORIDE SCH: 9 INJECTION, SOLUTION INTRAVENOUS at 13:13

## 2021-04-14 RX ADMIN — SODIUM CHLORIDE SCH MLS/HR: 9 INJECTION, SOLUTION INTRAVENOUS at 13:18

## 2021-04-14 NOTE — PCM.PN
- General Info


Date of Service: 04/14/21


Subjective Update: 





Mr. Pollard has been stable since admission last night with GI bleed and acute 

blood loss anemia.  Vital signs have been good and he has remained afebrile.  

There is been no further melena or hematochezia since admission and his 

hemoglobin level has been stable.


Functional Status: Reports: Ambulating, Urinating





- Review of Systems


General: Reports: Weakness, Fatigue.  Denies: Fever, Chills


Pulmonary: Reports: No Symptoms


Cardiovascular: Reports: No Symptoms


Gastrointestinal: Reports: No Symptoms





- Patient Data


Vitals - Most Recent: 


                                Last Vital Signs











Temp  97.3 F   04/14/21 10:00


 


Pulse  86   04/14/21 10:00


 


Resp  18   04/14/21 10:00


 


BP  131/59 L  04/14/21 10:00


 


Pulse Ox  96   04/14/21 10:00











Weight - Most Recent: 129 lb 12.79 oz


I&O - Last 24 Hours: 


                                 Intake & Output











 04/13/21 04/14/21 04/14/21





 22:59 06:59 14:59


 


Intake Total   100


 


Output Total   500


 


Balance   -400











Lab Results Last 24 Hours: 


                         Laboratory Results - last 24 hr











  04/13/21 04/13/21 04/13/21 Range/Units





  21:15 21:15 21:15 


 


WBC  8.3    (4.5-11.0)  K/uL


 


RBC  3.90 L    (4.30-5.90)  M/uL


 


Hgb  11.5 L    (12.0-15.0)  g/dL


 


Hct  34.8 L    (40.0-54.0)  %


 


MCV  89    (80-98)  fL


 


MCH  30    (27-31)  pg


 


MCHC  33    (32-36)  %


 


Plt Count  202    (150-400)  K/uL


 


Neut % (Auto)  76 H    (36-66)  %


 


Lymph % (Auto)  11 L    (24-44)  %


 


Mono % (Auto)  11 H    (2-6)  %


 


Eos % (Auto)  2    (2-4)  %


 


Baso % (Auto)  0    (0-1)  %


 


PT   12.0   (9.5-12.0)  sec


 


INR   1.10   (0.80-1.20)  


 


APTT   28.6   (27.0-36.0)  sec


 


Sodium    135 L  (140-148)  mmol/L


 


Potassium    3.7  (3.6-5.2)  mmol/L


 


Chloride    97 L  (100-108)  mmol/L


 


Carbon Dioxide    25  (21-32)  mmol/L


 


Anion Gap    16.7 H  (5.0-14.0)  mmol/L


 


BUN    11  (7-18)  mg/dL


 


Creatinine    0.8  (0.8-1.3)  mg/dL


 


Est Cr Clr Drug Dosing    59.20  mL/min


 


Estimated GFR (MDRD)    > 60  (>60)  


 


Glucose    139 H  ()  mg/dL


 


POC Glucose     ()  MG/DL


 


Calcium    8.9  (8.5-10.1)  mg/dL


 


Total Bilirubin    1.3 H  (0.2-1.0)  mg/dL


 


AST    142 H  (15-37)  U/L


 


ALT    216 H  (12-78)  U/L


 


Alkaline Phosphatase    264 H  ()  U/L


 


Total Protein    5.9 L  (6.4-8.2)  g/dL


 


Albumin    2.9 L  (3.4-5.0)  g/dL


 


Globulin    3.0  (2.3-3.5)  g/dL


 


Albumin/Globulin Ratio    1.0 L  (1.2-2.2)  


 


Influenza Type A RNA     (NEGATIVE)  


 


RSV RNA (INAAT)     (NEGATIVE)  


 


Influenza Type B RNA     (NEGATIVE)  


 


SARS-CoV-2 RNA (DARWIN)     (NEGATIVE)  


 


Blood Type     


 


Gel Antibody Screen     














  04/13/21 04/13/21 04/14/21 Range/Units





  21:15 22:45 02:10 


 


WBC    8.3  (4.5-11.0)  K/uL


 


RBC    4.31  (4.30-5.90)  M/uL


 


Hgb    12.4  (12.0-15.0)  g/dL


 


Hct    38.7 L  (40.0-54.0)  %


 


MCV    90  (80-98)  fL


 


MCH    29  (27-31)  pg


 


MCHC    32  (32-36)  %


 


Plt Count    206  (150-400)  K/uL


 


Neut % (Auto)    74 H  (36-66)  %


 


Lymph % (Auto)    12 L  (24-44)  %


 


Mono % (Auto)    12 H  (2-6)  %


 


Eos % (Auto)    2  (2-4)  %


 


Baso % (Auto)    0  (0-1)  %


 


PT     (9.5-12.0)  sec


 


INR     (0.80-1.20)  


 


APTT     (27.0-36.0)  sec


 


Sodium     (140-148)  mmol/L


 


Potassium     (3.6-5.2)  mmol/L


 


Chloride     (100-108)  mmol/L


 


Carbon Dioxide     (21-32)  mmol/L


 


Anion Gap     (5.0-14.0)  mmol/L


 


BUN     (7-18)  mg/dL


 


Creatinine     (0.8-1.3)  mg/dL


 


Est Cr Clr Drug Dosing     mL/min


 


Estimated GFR (MDRD)     (>60)  


 


Glucose     ()  mg/dL


 


POC Glucose     ()  MG/DL


 


Calcium     (8.5-10.1)  mg/dL


 


Total Bilirubin     (0.2-1.0)  mg/dL


 


AST     (15-37)  U/L


 


ALT     (12-78)  U/L


 


Alkaline Phosphatase     ()  U/L


 


Total Protein     (6.4-8.2)  g/dL


 


Albumin     (3.4-5.0)  g/dL


 


Globulin     (2.3-3.5)  g/dL


 


Albumin/Globulin Ratio     (1.2-2.2)  


 


Influenza Type A RNA   Negative   (NEGATIVE)  


 


RSV RNA (INAAT)   Negative   (NEGATIVE)  


 


Influenza Type B RNA   Negative   (NEGATIVE)  


 


SARS-CoV-2 RNA ([facility]WIN)   Negative   (NEGATIVE)  


 


Blood Type  A NEGATIVE    


 


Gel Antibody Screen  Negative    














  04/14/21 Range/Units





  06:00 


 


WBC   (4.5-11.0)  K/uL


 


RBC   (4.30-5.90)  M/uL


 


Hgb   (12.0-15.0)  g/dL


 


Hct   (40.0-54.0)  %


 


MCV   (80-98)  fL


 


MCH   (27-31)  pg


 


MCHC   (32-36)  %


 


Plt Count   (150-400)  K/uL


 


Neut % (Auto)   (36-66)  %


 


Lymph % (Auto)   (24-44)  %


 


Mono % (Auto)   (2-6)  %


 


Eos % (Auto)   (2-4)  %


 


Baso % (Auto)   (0-1)  %


 


PT   (9.5-12.0)  sec


 


INR   (0.80-1.20)  


 


APTT   (27.0-36.0)  sec


 


Sodium   (140-148)  mmol/L


 


Potassium   (3.6-5.2)  mmol/L


 


Chloride   (100-108)  mmol/L


 


Carbon Dioxide   (21-32)  mmol/L


 


Anion Gap   (5.0-14.0)  mmol/L


 


BUN   (7-18)  mg/dL


 


Creatinine   (0.8-1.3)  mg/dL


 


Est Cr Clr Drug Dosing   mL/min


 


Estimated GFR (MDRD)   (>60)  


 


Glucose   ()  mg/dL


 


POC Glucose  160 H  ()  MG/DL


 


Calcium   (8.5-10.1)  mg/dL


 


Total Bilirubin   (0.2-1.0)  mg/dL


 


AST   (15-37)  U/L


 


ALT   (12-78)  U/L


 


Alkaline Phosphatase   ()  U/L


 


Total Protein   (6.4-8.2)  g/dL


 


Albumin   (3.4-5.0)  g/dL


 


Globulin   (2.3-3.5)  g/dL


 


Albumin/Globulin Ratio   (1.2-2.2)  


 


Influenza Type A RNA   (NEGATIVE)  


 


RSV RNA (INAAT)   (NEGATIVE)  


 


Influenza Type B RNA   (NEGATIVE)  


 


SARS-CoV-2 RNA ([facility])   (NEGATIVE)  


 


Blood Type   


 


Gel Antibody Screen   











Med Orders - Current: 


                               Current Medications





Albuterol (Albuterol 0.083% 2.5 Mg/3 Ml Neb Soln)  2.5 mg NEB Q4H PRN


   PRN Reason: Shortness Of Breath/wheezing


Amlodipine Besylate (Amlodipine 5 Mg Tab)  5 mg PO DAILY [facility]


   Last Admin: 04/14/21 08:56 Dose:  5 mg


   Documented by: 


Ferrous Sulfate (Ferrous Sulfate 325 Mg Tab)  325 mg PO BIDMEALS [facility]


   Last Admin: 04/14/21 08:54 Dose:  Not Given


   Documented by: 


Pantoprazole Sodium 80 mg/ (Sodium Chloride)  100 mls @ 10 mls/hr IV .Q10H [facility]


   Last Admin: 04/14/21 13:18 Dose:  10 mls/hr


   Documented by: 


Isosorbide Mononitrate (Isosorbide Mononitrate 30 Mg Tab.Er)  60 mg PO DAILY [facility]


   Last Admin: 04/14/21 08:56 Dose:  60 mg


   Documented by: 


Metoprolol Succinate (Metoprolol Succinate 25 Mg Tab.Er)  25 mg PO DAILY [facility]


   Last Admin: 04/14/21 08:56 Dose:  25 mg


   Documented by: 


Morphine Sulfate (Morphine 2 Mg/Ml Syringe)  2 mg IVPUSH Q2H PRN


   PRN Reason: Pain (severe 7-10)


Ondansetron HCl (Ondansetron 4 Mg/2 Ml Sdv)  4 mg IV Q4H PRN


   PRN Reason: Nausea/Vomiting


Sodium Chloride (Sodium Chloride 0.9% 10 Ml Syringe)  10 ml FLUSH ASDIRECTED PRN


   PRN Reason: Keep Vein Open


Sotalol HCl (Sotalol 80 Mg Tab)  40 mg PO BID [facility]


   Last Admin: 04/14/21 08:55 Dose:  40 mg


   Documented by: 





Discontinued Medications





Bisacodyl (Bisacodyl 5 Mg Tab)  10 mg PO ONETIME ONE


   Stop: 04/14/21 00:09


   Last Admin: 04/14/21 02:05 Dose:  10 mg


   Documented by: 


Fentanyl (Fentanyl 100 Mcg/2 Ml Sdv) Confirm Administered Dose 100 mcg .ROUTE 

.STK-MED ONE


   Stop: 04/14/21 11:45


Pantoprazole Sodium 80 mg/ (Sodium Chloride)  100 mls @ 200 mls/hr IV ONETIME 

ONE


   Stop: 04/14/21 00:10


   Last Admin: 04/14/21 00:53 Dose:  200 mls/hr


   Documented by: 


Pantoprazole Sodium 80 mg/ (Sodium Chloride)  100 mls @ 10 mls/hr IV .Q10H [facility]


   Stop: 04/14/21 12:00


   Last Admin: 04/14/21 13:13 Dose:  Not Given


   Documented by: 


Sodium Chloride (Normal Saline)  1,000 mls @ 125 mls/hr IV ASDIRECTED [facility]


   Last Admin: 04/14/21 09:21 Dose:  125 mls/hr


   Documented by: 


Polyethylene Glycol (Polyethylene Glycol 3350 Powder 238 Gm Bot)  238 gm PO 

ONETIME ONE


   Stop: 04/14/21 00:07


   Last Admin: 04/14/21 00:24 Dose:  238 gram


   Documented by: 


Propofol (Propofol 200 Mg/20 Ml Sdv) Confirm Administered Dose 200 mg .ROUTE 

.STK-MED ONE


   Stop: 04/14/21 11:45


Sodium Chloride (Sodium Chloride 0.9% 10 Ml Syringe)  10 ml FLUSH ASDIRECTED PRN


   PRN Reason: Keep Vein Open


   Last Admin: 04/13/21 21:37 Dose:  10 ml


   Documented by: 











- Exam


Quality Assessment: DVT Prophylaxis


General: Alert, Oriented, Cooperative, No Acute Distress


Lungs: Clear to Auscultation, Normal Respiratory Effort


Cardiovascular: Regular Rate, Regular Rhythm, No Murmurs


GI/Abdominal Exam: Soft, Non-Tender, No Organomegaly, No Distention


Extremities: Non-Tender, No Pedal Edema





- Patient Data


Lab Results Last 24 hrs: 


                         Laboratory Results - last 24 hr











  04/13/21 04/13/21 04/13/21 Range/Units





  21:15 21:15 21:15 


 


WBC  8.3    (4.5-11.0)  K/uL


 


RBC  3.90 L    (4.30-5.90)  M/uL


 


Hgb  11.5 L    (12.0-15.0)  g/dL


 


Hct  34.8 L    (40.0-54.0)  %


 


MCV  89    (80-98)  fL


 


MCH  30    (27-31)  pg


 


MCHC  33    (32-36)  %


 


Plt Count  202    (150-400)  K/uL


 


Neut % (Auto)  76 H    (36-66)  %


 


Lymph % (Auto)  11 L    (24-44)  %


 


Mono % (Auto)  11 H    (2-6)  %


 


Eos % (Auto)  2    (2-4)  %


 


Baso % (Auto)  0    (0-1)  %


 


PT   12.0   (9.5-12.0)  sec


 


INR   1.10   (0.80-1.20)  


 


APTT   28.6   (27.0-36.0)  sec


 


Sodium    135 L  (140-148)  mmol/L


 


Potassium    3.7  (3.6-5.2)  mmol/L


 


Chloride    97 L  (100-108)  mmol/L


 


Carbon Dioxide    25  (21-32)  mmol/L


 


Anion Gap    16.7 H  (5.0-14.0)  mmol/L


 


BUN    11  (7-18)  mg/dL


 


Creatinine    0.8  (0.8-1.3)  mg/dL


 


Est Cr Clr Drug Dosing    59.20  mL/min


 


Estimated GFR (MDRD)    > 60  (>60)  


 


Glucose    139 H  ()  mg/dL


 


POC Glucose     ()  MG/DL


 


Calcium    8.9  (8.5-10.1)  mg/dL


 


Total Bilirubin    1.3 H  (0.2-1.0)  mg/dL


 


AST    142 H  (15-37)  U/L


 


ALT    216 H  (12-78)  U/L


 


Alkaline Phosphatase    264 H  ()  U/L


 


Total Protein    5.9 L  (6.4-8.2)  g/dL


 


Albumin    2.9 L  (3.4-5.0)  g/dL


 


Globulin    3.0  (2.3-3.5)  g/dL


 


Albumin/Globulin Ratio    1.0 L  (1.2-2.2)  


 


Influenza Type A RNA     (NEGATIVE)  


 


RSV RNA (INAAT)     (NEGATIVE)  


 


Influenza Type B RNA     (NEGATIVE)  


 


SARS-CoV-2 RNA ([facility])     (NEGATIVE)  


 


Blood Type     


 


Gel Antibody Screen     














  04/13/21 04/13/21 04/14/21 Range/Units





  21:15 22:45 02:10 


 


WBC    8.3  (4.5-11.0)  K/uL


 


RBC    4.31  (4.30-5.90)  M/uL


 


Hgb    12.4  (12.0-15.0)  g/dL


 


Hct    38.7 L  (40.0-54.0)  %


 


MCV    90  (80-98)  fL


 


MCH    29  (27-31)  pg


 


MCHC    32  (32-36)  %


 


Plt Count    206  (150-400)  K/uL


 


Neut % (Auto)    74 H  (36-66)  %


 


Lymph % (Auto)    12 L  (24-44)  %


 


Mono % (Auto)    12 H  (2-6)  %


 


Eos % (Auto)    2  (2-4)  %


 


Baso % (Auto)    0  (0-1)  %


 


PT     (9.5-12.0)  sec


 


INR     (0.80-1.20)  


 


APTT     (27.0-36.0)  sec


 


Sodium     (140-148)  mmol/L


 


Potassium     (3.6-5.2)  mmol/L


 


Chloride     (100-108)  mmol/L


 


Carbon Dioxide     (21-32)  mmol/L


 


Anion Gap     (5.0-14.0)  mmol/L


 


BUN     (7-18)  mg/dL


 


Creatinine     (0.8-1.3)  mg/dL


 


Est Cr Clr Drug Dosing     mL/min


 


Estimated GFR (MDRD)     (>60)  


 


Glucose     ()  mg/dL


 


POC Glucose     ()  MG/DL


 


Calcium     (8.5-10.1)  mg/dL


 


Total Bilirubin     (0.2-1.0)  mg/dL


 


AST     (15-37)  U/L


 


ALT     (12-78)  U/L


 


Alkaline Phosphatase     ()  U/L


 


Total Protein     (6.4-8.2)  g/dL


 


Albumin     (3.4-5.0)  g/dL


 


Globulin     (2.3-3.5)  g/dL


 


Albumin/Globulin Ratio     (1.2-2.2)  


 


Influenza Type A RNA   Negative   (NEGATIVE)  


 


RSV RNA (INAAT)   Negative   (NEGATIVE)  


 


Influenza Type B RNA   Negative   (NEGATIVE)  


 


SARS-CoV-2 RNA ([facility]WIN)   Negative   (NEGATIVE)  


 


Blood Type  A NEGATIVE    


 


Gel Antibody Screen  Negative    














  04/14/21 Range/Units





  06:00 


 


WBC   (4.5-11.0)  K/uL


 


RBC   (4.30-5.90)  M/uL


 


Hgb   (12.0-15.0)  g/dL


 


Hct   (40.0-54.0)  %


 


MCV   (80-98)  fL


 


MCH   (27-31)  pg


 


MCHC   (32-36)  %


 


Plt Count   (150-400)  K/uL


 


Neut % (Auto)   (36-66)  %


 


Lymph % (Auto)   (24-44)  %


 


Mono % (Auto)   (2-6)  %


 


Eos % (Auto)   (2-4)  %


 


Baso % (Auto)   (0-1)  %


 


PT   (9.5-12.0)  sec


 


INR   (0.80-1.20)  


 


APTT   (27.0-36.0)  sec


 


Sodium   (140-148)  mmol/L


 


Potassium   (3.6-5.2)  mmol/L


 


Chloride   (100-108)  mmol/L


 


Carbon Dioxide   (21-32)  mmol/L


 


Anion Gap   (5.0-14.0)  mmol/L


 


BUN   (7-18)  mg/dL


 


Creatinine   (0.8-1.3)  mg/dL


 


Est Cr Clr Drug Dosing   mL/min


 


Estimated GFR (MDRD)   (>60)  


 


Glucose   ()  mg/dL


 


POC Glucose  160 H  ()  MG/DL


 


Calcium   (8.5-10.1)  mg/dL


 


Total Bilirubin   (0.2-1.0)  mg/dL


 


AST   (15-37)  U/L


 


ALT   (12-78)  U/L


 


Alkaline Phosphatase   ()  U/L


 


Total Protein   (6.4-8.2)  g/dL


 


Albumin   (3.4-5.0)  g/dL


 


Globulin   (2.3-3.5)  g/dL


 


Albumin/Globulin Ratio   (1.2-2.2)  


 


Influenza Type A RNA   (NEGATIVE)  


 


RSV RNA (INAAT)   (NEGATIVE)  


 


Influenza Type B RNA   (NEGATIVE)  


 


SARS-CoV-2 RNA ([facility]ARWIN)   (NEGATIVE)  


 


Blood Type   


 


Gel Antibody Screen   











Result Diagrams: 


                                 04/14/21 02:10





                                 04/13/21 21:15





Sepsis Event Note





- Evaluation


Sepsis Screening Result: No Definite Risk





- Focused Exam


Vital Signs: 


                                   Vital Signs











  Temp Pulse Pulse Resp BP BP Pulse Ox


 


 04/14/21 10:00  97.3 F   86  18   131/59 L  96


 


 04/14/21 08:56   75    159/65 H  


 


 04/14/21 08:55   75    159/65 H  


 


 04/14/21 07:46  97.2 F   75  16   159/65 H  97


 


 04/14/21 07:40        96


 


 04/14/21 03:34  97.3 F   79  13   155/98 H  97














- Problem List Review


Problem List Initiated/Reviewed/Updated: Yes





- My Orders


Last 24 Hours: 


My Active Orders





04/14/21 14:02


Convert IV to Saline Lock [OM.PC] Routine 





04/14/21 17:00


HGB [HEMOGLOBIN] [HEME] Stat 





04/15/21 05:00


BASIC METABOLIC PANEL,BMP [CHEM] Timed 


CBC WITH AUTO DIFF [HEME] Timed 














- Plan


Plan:: 


 


ASSESSMENT AND PLAN 





Lower GI hemorrhage- reports 3 episode of bright red rectal bleeding. Was seen 

in ER yesterday had workup see ER vist 4-, hemoglobin 13 now at 11. He 

report last 10/2020 had a colonscopy, Endoscopy and barium enema. Did not find a

 cause for rectal bleeding. 


-IV Protonix 80 mg bolus, then start IV Protonix drip 


-IV fluids overnight


-Pain and nausea management


-bowel prep


-2 IV access sites.


-Type an screen for blood products


-Surgical consultation with Dr. Hernandes, EGD and colonoscopy this afternoon





Atrial Fib. chronic


-telemetry


-Eliquis 5 mg po bid- hold 


-am labs.





Maintenance issues - 


- DVT prophylaxis -SCD


- GI prophylaxis -IV PPI


- Nutrition -nothing by mouth


- Mondragon catheter -not indicated at this time


-consult to Spiritual care





CODE STATUS -full code





Admission justification -this patient will be admitted for inpatient services 

and is medically appropriate meeting medical necessity for inpatient admission a

s outlined in my documentation.  I reasonably expect the patient will require 

inpatient services that span a period time over 2 midnights. I reasonably expect

 this patient to be discharged or transferred within 96 hours after admission to

 the [facility].





Disposition -I would anticipate discharge home after the hospital stay





Primary care physician -Dr. Montenegro





Hospitalist- Laith Mandujano M.D.

## 2021-04-14 NOTE — PCM.HP.2
H&P History of Present Illness





- General


Date of Service: 21


Admit Problem/Dx: 


                           Admission Diagnosis/Problem





Admission Diagnosis/Problem      Gastrointestinal hemorrhage








Source of Information: Patient


History Limitations: Reports: No Limitations





- History of Present Illness


Initial Comments - Free Text/Narative: 





Contreras was admitted on 2021 at 0123 for GI hemorrhage. 


he states that he has been having black stools then bright red then black with 

bright red bloody stools. 


he came into the ED and was admitted for an EGD and Colonscopy.


Currently having several loose stools from colonoscopy prep. 








Onset of Symptoms: Reports: Other (Yesterday)


Duration of Symptoms: Reports: Day(s):


Location: Reports: Abdomen (No Abdominal pain )


Improves with: Reports: None


Worsens with: Reports: None


Associated Symptoms: Reports: No Other Symptoms


  ** Right Flank


Pain Score (Numeric/FACES): 1





- Related Data


Allergies/Adverse Reactions: 


                                    Allergies











Allergy/AdvReac Type Severity Reaction Status Date / Time


 


naproxen Allergy Unknown Other Verified 21 20:45


 


naproxen sodium [From Aleve] Allergy Unknown Cannot Verified 21 20:45





   Remember  


 


lisinopril Allergy  Swelling Verified 21 20:45











Home Medications: 


                                    Home Meds





Aspirin 81 mg PO BEDTIME 14 [History]


Calcium Carbonate 1,500 mg PO BIDM 14 [History]


Cholecalciferol (Vitamin D3) [Vitamin D3] 2,000 unit PO BID 14 [History]


Fluticasone Propionate [Flonase] 2 sprays [employer] DAILY 14 [History]


Metoprolol Succinate [Toprol XL] 25 mg PO DAILY 14 [History]


Omeprazole [Prilosec] 20 mg PO BID 14 [History]


atorvaSTATin [Lipitor] 20 mg PO BEDTIME 14 [History]


Isosorbide Mononitrate [Imdur] 60 mg PO DAILY 18 [History]


amLODIPine Besylate [Norvasc] 5 mg PO DAILY 18 [History]


hydrALAZINE [Apresoline] 50 mg PO TID 18 [History]


Albuterol Sulfate [Proair Hfa] 2 puff IH Q6HR PRN 10/02/20 [History]


Iron 65 mg PO BID 10/02/20 [History]


Sotalol [Betapace, Sorine] 40 mg PO BID 10/02/20 [History]


traMADol [Ultram] 50 mg PO Q8HR PRN 10/02/20 [History]


Apixaban [Eliquis] 5 mg PO BID 10/05/20 [History]











Past Medical History


HEENT History: Reports: Cataract, Hard of Hearing


Cardiovascular History: Reports: Arrhythmia, CAD, Heart Failure, High 

Cholesterol, Hypertension, PVD, SOB on Exertion


Respiratory History: Reports: COPD


Gastrointestinal History: Reports: GERD


Genitourinary History: Reports: Prostate Disorder


Musculoskeletal History: Reports: Arthritis


Neurological History: Reports: TIA


Psychiatric History: Reports: Depression


Endocrine/Metabolic History: Reports: Diabetes, Type II


Hematologic History: Reports: Iron Deficiency


Oncologic (Cancer) History: Reports: Prostate


Dermatologic History: Reports: Other (See Below)


Other Dermatologic History: minor skin cancer behind right ear





- Infectious Disease History


Infectious Disease History: Reports: Chicken Pox, Measles, Mumps


Other Infectious Disease History: history of ecoli





- Past Surgical History


HEENT Surgical History: Reports: Detached Retina


Cardiovascular Surgical History: Reports: None


Respiratory Surgical History: Reports: None


GI Surgical History: Reports: Colonoscopy, EGD, Hernia, Abdominal, Hernia, 

Inguinal


Male  Surgical History: Reports: Prostate Biopsy, Other (See Below)


Other Male  Surgeries/Procedures: 7 days of radiation


Endocrine Surgical History: Reports: None


Neurological Surgical History: Reports: None


Musculoskeletal Surgical History: Reports: Other (See Below)


Other Musculoskeletal Surgeries/Procedures:: Right hip surgery in 2018 repair 

fracture after fall


Oncologic Surgical History: Reports: None





Social & Family History





- Family History


Family Medical History: No Pertinent Family History





- Tobacco Use


Tobacco Use Status *Q: Former Tobacco User


Years of Tobacco use: 60


Packs/Tins Daily: 1


Used Tobacco, but Quit: Yes


Month/Year Tobacco Last Used: 


Second Hand Smoke Exposure: No





- Caffeine Use


Caffeine Use: Reports: Coffee


Other Caffeine Use: 2 cups daily


Caffeine Use Comment: 2 cups





- Alcohol Use


Days Per Week of Alcohol Use: 7


Number of Drinks Per Day: 1


Total Drinks Per Week: 7


Date of Last Drink: 21


Time of Last Drink: 18:00





- Recreational Drug Use


Recreational Drug Use: No





- Living Situation & Occupation


Living situation: Reports:  (reports very active, just got home from 

[place] last week. Wife  2017, Daughter  of a heart attack 2021. has one Son who lives out of state. .)





H&P Review of Systems





- Review of Systems:


Review Of Systems: Comprehensive ROS is negative, except as noted in HPI.





Exam





- Exam


Exam: See Below





- Vital Signs


Vital Signs: 


                                Last Vital Signs











Temp  97.2 F   21 07:46


 


Pulse  75   21 07:46


 


Resp  16   21 07:46


 


BP  159/65 H  21 07:46


 


Pulse Ox  97   21 07:46











Weight: 129 lb 12.8 oz





- Exam


Quality Assessment: DVT Prophylaxis


General: Alert, Oriented, Cooperative


HEENT: PERRLA, Conjunctiva Clear


Neck: Supple


Lungs: Clear to Auscultation, Normal Respiratory Effort


Cardiovascular: Regular Rate, Regular Rhythm


GI/Abdominal Exam: Soft, Non-Tender, No Distention


 (Male) Exam: Deferred


Rectal (Males) Exam: Deferred


Back Exam: Normal Inspection, Full Range of Motion


Extremities: Normal Inspection, Normal Range of Motion


Skin: Warm, Dry, Intact


Neurological: Cranial Nerves Intact, Reflexes Equal Bilateral


Neuro Extensive - Mental Status: Alert, Oriented x3, Normal Mood/Affect, Normal 

Cognition, Memory Intact


Neuro Extensive - Motor, Sensory, Reflexes: CN II-XII Intact, Normal Gait, 

Normal Reflexes


Psychiatric: Alert, Normal Affect, Normal Mood





- Patient Data


Lab Results Last 24 hrs: 


                         Laboratory Results - last 24 hr











  21 Range/Units





  21:15 21:15 21:15 


 


WBC  8.3    (4.5-11.0)  K/uL


 


RBC  3.90 L    (4.30-5.90)  M/uL


 


Hgb  11.5 L    (12.0-15.0)  g/dL


 


Hct  34.8 L    (40.0-54.0)  %


 


MCV  89    (80-98)  fL


 


MCH  30    (27-31)  pg


 


MCHC  33    (32-36)  %


 


Plt Count  202    (150-400)  K/uL


 


Neut % (Auto)  76 H    (36-66)  %


 


Lymph % (Auto)  11 L    (24-44)  %


 


Mono % (Auto)  11 H    (2-6)  %


 


Eos % (Auto)  2    (2-4)  %


 


Baso % (Auto)  0    (0-1)  %


 


PT   12.0   (9.5-12.0)  sec


 


INR   1.10   (0.80-1.20)  


 


APTT   28.6   (27.0-36.0)  sec


 


Sodium    135 L  (140-148)  mmol/L


 


Potassium    3.7  (3.6-5.2)  mmol/L


 


Chloride    97 L  (100-108)  mmol/L


 


Carbon Dioxide    25  (21-32)  mmol/L


 


Anion Gap    16.7 H  (5.0-14.0)  mmol/L


 


BUN    11  (7-18)  mg/dL


 


Creatinine    0.8  (0.8-1.3)  mg/dL


 


Est Cr Clr Drug Dosing    59.20  mL/min


 


Estimated GFR (MDRD)    > 60  (>60)  


 


Glucose    139 H  ()  mg/dL


 


POC Glucose     ()  MG/DL


 


Calcium    8.9  (8.5-10.1)  mg/dL


 


Total Bilirubin    1.3 H  (0.2-1.0)  mg/dL


 


AST    142 H  (15-37)  U/L


 


ALT    216 H  (12-78)  U/L


 


Alkaline Phosphatase    264 H  ()  U/L


 


Total Protein    5.9 L  (6.4-8.2)  g/dL


 


Albumin    2.9 L  (3.4-5.0)  g/dL


 


Globulin    3.0  (2.3-3.5)  g/dL


 


Albumin/Globulin Ratio    1.0 L  (1.2-2.2)  


 


Influenza Type A RNA     (NEGATIVE)  


 


RSV RNA (INAAT)     (NEGATIVE)  


 


Influenza Type B RNA     (NEGATIVE)  


 


SARS-CoV-2 RNA ([facility]WIN)     (NEGATIVE)  


 


Blood Type     


 


Gel Antibody Screen     














  21 Range/Units





  21:15 22:45 02:10 


 


WBC    8.3  (4.5-11.0)  K/uL


 


RBC    4.31  (4.30-5.90)  M/uL


 


Hgb    12.4  (12.0-15.0)  g/dL


 


Hct    38.7 L  (40.0-54.0)  %


 


MCV    90  (80-98)  fL


 


MCH    29  (27-31)  pg


 


MCHC    32  (32-36)  %


 


Plt Count    206  (150-400)  K/uL


 


Neut % (Auto)    74 H  (36-66)  %


 


Lymph % (Auto)    12 L  (24-44)  %


 


Mono % (Auto)    12 H  (2-6)  %


 


Eos % (Auto)    2  (2-4)  %


 


Baso % (Auto)    0  (0-1)  %


 


PT     (9.5-12.0)  sec


 


INR     (0.80-1.20)  


 


APTT     (27.0-36.0)  sec


 


Sodium     (140-148)  mmol/L


 


Potassium     (3.6-5.2)  mmol/L


 


Chloride     (100-108)  mmol/L


 


Carbon Dioxide     (21-32)  mmol/L


 


Anion Gap     (5.0-14.0)  mmol/L


 


BUN     (7-18)  mg/dL


 


Creatinine     (0.8-1.3)  mg/dL


 


Est Cr Clr Drug Dosing     mL/min


 


Estimated GFR (MDRD)     (>60)  


 


Glucose     ()  mg/dL


 


POC Glucose     ()  MG/DL


 


Calcium     (8.5-10.1)  mg/dL


 


Total Bilirubin     (0.2-1.0)  mg/dL


 


AST     (15-37)  U/L


 


ALT     (12-78)  U/L


 


Alkaline Phosphatase     ()  U/L


 


Total Protein     (6.4-8.2)  g/dL


 


Albumin     (3.4-5.0)  g/dL


 


Globulin     (2.3-3.5)  g/dL


 


Albumin/Globulin Ratio     (1.2-2.2)  


 


Influenza Type A RNA   Negative   (NEGATIVE)  


 


RSV RNA (INAAT)   Negative   (NEGATIVE)  


 


Influenza Type B RNA   Negative   (NEGATIVE)  


 


SARS-CoV-2 RNA ([facility]WIN)   Negative   (NEGATIVE)  


 


Blood Type  A NEGATIVE    


 


Gel Antibody Screen  Negative    














  21 Range/Units





  06:00 


 


WBC   (4.5-11.0)  K/uL


 


RBC   (4.30-5.90)  M/uL


 


Hgb   (12.0-15.0)  g/dL


 


Hct   (40.0-54.0)  %


 


MCV   (80-98)  fL


 


MCH   (27-31)  pg


 


MCHC   (32-36)  %


 


Plt Count   (150-400)  K/uL


 


Neut % (Auto)   (36-66)  %


 


Lymph % (Auto)   (24-44)  %


 


Mono % (Auto)   (2-6)  %


 


Eos % (Auto)   (2-4)  %


 


Baso % (Auto)   (0-1)  %


 


PT   (9.5-12.0)  sec


 


INR   (0.80-1.20)  


 


APTT   (27.0-36.0)  sec


 


Sodium   (140-148)  mmol/L


 


Potassium   (3.6-5.2)  mmol/L


 


Chloride   (100-108)  mmol/L


 


Carbon Dioxide   (21-32)  mmol/L


 


Anion Gap   (5.0-14.0)  mmol/L


 


BUN   (7-18)  mg/dL


 


Creatinine   (0.8-1.3)  mg/dL


 


Est Cr Clr Drug Dosing   mL/min


 


Estimated GFR (MDRD)   (>60)  


 


Glucose   ()  mg/dL


 


POC Glucose  160 H  ()  MG/DL


 


Calcium   (8.5-10.1)  mg/dL


 


Total Bilirubin   (0.2-1.0)  mg/dL


 


AST   (15-37)  U/L


 


ALT   (12-78)  U/L


 


Alkaline Phosphatase   ()  U/L


 


Total Protein   (6.4-8.2)  g/dL


 


Albumin   (3.4-5.0)  g/dL


 


Globulin   (2.3-3.5)  g/dL


 


Albumin/Globulin Ratio   (1.2-2.2)  


 


Influenza Type A RNA   (NEGATIVE)  


 


RSV RNA (INAAT)   (NEGATIVE)  


 


Influenza Type B RNA   (NEGATIVE)  


 


SARS-CoV-2 RNA (DARWIN)   (NEGATIVE)  


 


Blood Type   


 


Gel Antibody Screen   











Result Diagrams: 


                                 21 02:10





                                 21 21:15





Sepsis Event Note





- Evaluation


Sepsis Screening Result: No Definite Risk





- Focused Exam


Vital Signs: 


                                   Vital Signs











  Temp Pulse Resp BP Pulse Ox


 


 21 07:46  97.2 F  75  16  159/65 H  97


 


 21 07:40      96


 


 21 03:34  97.3 F  79  13  155/98 H  97


 


 21 23:41      98


 


 21 23:36  97.8 F  83  12  150/84 H  100


 


 21 22:45   72  16  114/90  98


 


 21 21:41   70  18  154/58 H  97


 


 21 20:43  98.0 F  76  20  163/81 H  98











Problem List Initiated/Reviewed/Updated: Yes


Orders Last 24hrs: 


                               Active Orders 24 hr











 Category Date Time Status


 


 Cardiac Monitoring [RC] .As Directed Care  21 21:00 Active


 


 Cardiac Monitoring [RC] CONTINUOUS Care  21 23:41 Active


 


 Communication Order [RC] ASDIRECTED Care  21 07:30 Active


 


 Head of Bed Elevation [RC] ASDIRECTED Care  21 23:41 Active


 


 Intake and Output [RC] QSHIFT Care  21 23:41 Active


 


 Notify Provider Consults [RC] ASDIRECTED Care  21 23:41 Active


 


 Notify Provider Vital Signs [RC] ASDIRECTED Care  21 23:41 Active


 


 Overnight Pulse Oximetry [RC] Click to Edit Care  21 21:00 Active


 


 Oxygen Therapy [RC] PRN Care  21 23:41 Active


 


 Pulse Oximetry [RC] PRN Care  21 23:41 Active


 


 RT Aerosol Therapy [RC] ASDIRECTED Care  21 23:41 Active


 


 Up ad Neelima [RC] ASDIRECTED Care  21 23:41 Active


 


 Verify Patient Consent Obtain [RC] ASDIRECTED Care  21 14:00 Active


 


 Vital Signs [RC] Q4H Care  21 23:41 Active


 


 Consult to Physician [CONS] Routine Cons  21 23:41 Ordered


 


 Consult to Spiritual Care [CONS] Routine Cons  21 23:41 Active


 


 NPO Now [Nothing per Oral Now Diet] [DIET] Diet  21 Breakfast Active


 


 Nothing per Oral Now Diet [DIET] Diet  21 Dinner Active


 


 OCCULT BLOOD SCREEN [OP] Urgent Lab  21 00:57 Received


 


 Albuterol [Proventil Neb Soln] Med  21 23:41 Active





 2.5 mg NEB Q4H PRN   


 


 Ferrous Sulfate Med  21 08:00 Active





 325 mg PO BIDMEALS   


 


 Isosorbide Mononitrate [Imdur] Med  21 09:00 Active





 60 mg PO DAILY   


 


 Metoprolol Succinate [Toprol XL] Med  21 09:00 Active





 25 mg PO DAILY   


 


 Morphine Med  21 23:41 Active





 2 mg IVPUSH Q2H PRN   


 


 Ondansetron [Zofran] Med  21 23:41 Active





 4 mg IV Q4H PRN   


 


 Sodium Chloride 0.9% [Normal Saline] 1,000 ml Med  21 23:41 Active





 IV ASDIRECTED   


 


 Sodium Chloride 0.9% [Normal Saline] 100 ml Med  21 23:41 Active





 Pantoprazole [ProTONIX IV***] 80 mg   





 IV 10 mls/hr   


 


 Sodium Chloride 0.9% [Saline Flush] Med  21 23:41 Active





 10 ml FLUSH ASDIRECTED PRN   


 


 Sotalol [Betapace] Med  21 09:00 Active





 40 mg PO BID   


 


 amLODIPine [Norvasc] Med  21 09:00 Active





 5 mg PO DAILY   


 


 Isolation [COMM] Stat Oth  21 22:46 Ordered


 


 Peripheral IV Insertion Adult [OM.PC] Urgent Oth  21 20:58 Ordered


 


 Pulse Oximetry Continuous Monitoring [OM.PC] Routine Oth  21 21:00 

Ordered


 


 Saline Lock Insert [OM.PC] Routine Oth  21 23:41 Ordered


 


 Sequential Compression Device [OM.PC] Per Unit Routine Oth  21 23:41 

Ordered


 


 Resuscitation Status Routine Resus Stat  21 22:51 Ordered








                                Medication Orders





Albuterol (Albuterol 0.083% 2.5 Mg/3 Ml Neb Soln)  2.5 mg NEB Q4H PRN


   PRN Reason: Shortness Of Breath/wheezing


Amlodipine Besylate (Amlodipine 5 Mg Tab)  5 mg PO DAILY ZELALEM


Ferrous Sulfate (Ferrous Sulfate 325 Mg Tab)  325 mg PO BIDMEALS [facility]


Pantoprazole Sodium 80 mg/ (Sodium Chloride)  100 mls @ 10 mls/hr IV .Q10H [facility]


   Last Admin: 21 02:02  Dose: 10 mls/hr


   Documented by: SHANNON   Cosigned by: JOSIAS


Sodium Chloride (Normal Saline)  1,000 mls @ 125 mls/hr IV ASDIRECTED ZELALEM


   Last Admin: 21 00:53  Dose: 125 mls/hr


   Documented by: JOSIAS


Isosorbide Mononitrate (Isosorbide Mononitrate 30 Mg Tab.Er)  60 mg PO DAILY [facility]


Metoprolol Succinate (Metoprolol Succinate 25 Mg Tab.Er)  25 mg PO DAILY [facility]


Morphine Sulfate (Morphine 2 Mg/Ml Syringe)  2 mg IVPUSH Q2H PRN


   PRN Reason: Pain (severe 7-10)


Ondansetron HCl (Ondansetron 4 Mg/2 Ml Sdv)  4 mg IV Q4H PRN


   PRN Reason: Nausea/Vomiting


Sodium Chloride (Sodium Chloride 0.9% 10 Ml Syringe)  10 ml FLUSH ASDIRECTED PRN


   PRN Reason: Keep Vein Open


Sotalol HCl (Sotalol 80 Mg Tab)  40 mg PO BID ZELALEM








Assessment/Plan Comment:: 








Assessment/Plan Comment:: 





ASSESSMENT AND PLAN - Gastrointestinal bleeding.





GI Bleed 


EGD and Colonoscopy today - have consent signed 


No Nadeeminul 





May have cardiac medications with a sip of water.


Ice chips until 0900 then NPO 





CODE STATUS -full code








Prognosis:Good








Gudelia RUBIN 


2021





- Mortality Measure


Prognosis:: Good

## 2021-04-14 NOTE — PCM.HP.2
H&P History of Present Illness





- General


Date of Service: 21


Admit Problem/Dx: 


                           Admission Diagnosis/Problem





Admission Diagnosis/Problem      Gastrointestinal hemorrhage








Source of Information: Patient, Provider, RN


History Limitations: Reports: No Limitations





- History of Present Illness


Initial Comments - Free Text/Narative: 





chief complaint: rectal bleeding.





Presents emergency room today secondary to concern about bloody diarrhea he 

states that he felt fine today actually had no abdominal pain per emergency room

please refer to that visit for full details.  Dates that around 3 PM he had 

black splatter bloody stool he called his PCM he was instructed to watch and 

keep his appointment as scheduled on Thursday.  he had a repeat episode around 

0 that he reports as being just bloody stool in nature bright red blood he 

did take pictures of this and did show them to this physician from his phone he 

denies any lightheaded dizziness abdominal pain nausea vomiting discomfort 

otherwise.





Patient is  and lives by himself is returned from [place] where he winter

s





Medical history medications allergies no history are all reviewed there are no 

updates or changes to yesterday's encounter


  ** Right Flank


Onset of Symptoms: Reports: Today


Symptom Onset Date: 21


Symptom Onset Time: 15:00


Duration of Symptoms: Reports: Constant, Intermittent (rectal bleeding at 3pm., 

5 pm., & 9 pm.)


Location: Reports: Other (rectal bleeding)


Quality: Reports: Same as Previous Episode (reports had similar episode of recal

bleeding last 2020- he had a colonscopy, EGD, barium enema, Reports did 

not find anything wrong.)


Severity: Moderate


Improves with: Reports: None


Worsens with: Reports: None


Context: Reports: Other (rectal bleeding 10/2020)


Associated Symptoms: Reports: No Other Symptoms


  ** Right Flank


Pain Score (Numeric/FACES): 1





- Related Data


Allergies/Adverse Reactions: 


                                    Allergies











Allergy/AdvReac Type Severity Reaction Status Date / Time


 


naproxen Allergy Unknown Other Verified 21 20:45


 


naproxen sodium [From Aleve] Allergy Unknown Cannot Verified 21 20:45





   Remember  


 


lisinopril Allergy  Swelling Verified 21 20:45











Home Medications: 


                                    Home Meds





Aspirin 81 mg PO BEDTIME 14 [History]


Calcium Carbonate 1,500 mg PO BIDM 14 [History]


Cholecalciferol (Vitamin D3) [Vitamin D3] 2,000 unit PO BID 14 [History]


Fluticasone Propionate [Flonase] 2 sprays [employer] DAILY 14 [History]


Metoprolol Succinate [Toprol XL] 25 mg PO DAILY 14 [History]


Omeprazole [Prilosec] 20 mg PO BID 14 [History]


atorvaSTATin [Lipitor] 20 mg PO BEDTIME 14 [History]


Isosorbide Mononitrate [Imdur] 60 mg PO DAILY 18 [History]


amLODIPine Besylate [Norvasc] 5 mg PO DAILY 18 [History]


hydrALAZINE [Apresoline] 50 mg PO TID 18 [History]


Albuterol Sulfate [Proair Hfa] 2 puff IH Q6HR PRN 10/02/20 [History]


Iron 65 mg PO BID 10/02/20 [History]


Sotalol [Betapace, Sorine] 40 mg PO BID 10/02/20 [History]


traMADol [Ultram] 50 mg PO Q8HR PRN 10/02/20 [History]


Apixaban [Eliquis] 5 mg PO BID 10/05/20 [History]











Past Medical History


HEENT History: Reports: Cataract, Hard of Hearing


Cardiovascular History: Reports: Arrhythmia, CAD, Heart Failure, High Cholestero

l, Hypertension, PVD, SOB on Exertion


Respiratory History: Reports: COPD


Gastrointestinal History: Reports: GERD


Genitourinary History: Reports: Prostate Disorder


Musculoskeletal History: Reports: Arthritis


Neurological History: Reports: TIA


Psychiatric History: Reports: Depression


Endocrine/Metabolic History: Reports: Diabetes, Type II


Hematologic History: Reports: Iron Deficiency


Oncologic (Cancer) History: Reports: Prostate


Dermatologic History: Reports: Other (See Below)


Other Dermatologic History: minor skin cancer behind right ear





- Infectious Disease History


Infectious Disease History: Reports: Chicken Pox, Measles, Mumps


Other Infectious Disease History: history of ecoli





- Past Surgical History


HEENT Surgical History: Reports: Detached Retina


Cardiovascular Surgical History: Reports: None


Respiratory Surgical History: Reports: None


GI Surgical History: Reports: Colonoscopy, EGD, Hernia, Abdominal, Hernia, 

Inguinal


Male  Surgical History: Reports: Prostate Biopsy, Other (See Below)


Other Male  Surgeries/Procedures: 7 days of radiation


Endocrine Surgical History: Reports: None


Neurological Surgical History: Reports: None


Musculoskeletal Surgical History: Reports: Other (See Below)


Other Musculoskeletal Surgeries/Procedures:: Right hip surgery in 2018 repair 

fracture after fall


Oncologic Surgical History: Reports: None





Social & Family History





- Family History


Family Medical History: No Pertinent Family History





- Tobacco Use


Tobacco Use Status *Q: Former Tobacco User


Years of Tobacco use: 60


Packs/Tins Daily: 1


Used Tobacco, but Quit: Yes


Month/Year Tobacco Last Used: 


Second Hand Smoke Exposure: No





- Caffeine Use


Caffeine Use: Reports: Coffee


Other Caffeine Use: 2 cups daily


Caffeine Use Comment: 2 cups





- Alcohol Use


Days Per Week of Alcohol Use: 7


Number of Drinks Per Day: 1


Total Drinks Per Week: 7


Date of Last Drink: 21


Time of Last Drink: 18:00





- Recreational Drug Use


Recreational Drug Use: No





- Living Situation & Occupation


Living situation: Reports:  (reports very active, just got home from 

[place] last week. Wife  2017, Daughter  of a heart attack 2021. has one Son who lives out of [facility]. .)





H&P Review of Systems





- Review of Systems:


Review Of Systems: See Below


General: Reports: Other (rectal bleeding)


HEENT: Reports: Other (dentures, glasses and hearing aides)


Pulmonary: Reports: No Symptoms


Cardiovascular: Reports: No Symptoms, Other (history of atrial fib with chronic 

anticoagulation, treated with Eliquis 5 mg po bid)


Gastrointestinal: Reports: No Symptoms


Genitourinary: Reports: No Symptoms


Musculoskeletal: Reports: Muscle Stiffness


Skin: Reports: No Symptoms


Psychiatric: Reports: No Symptoms


Neurological: Reports: No Symptoms


Hematologic/Lymphatic: Reports: Easy Bleeding


Immunologic: Reports: No Symptoms





Exam





- Exam


Exam: See Below





- Vital Signs


Vital Signs: 


                                Last Vital Signs











Temp  97.8 F   21 23:36


 


Pulse  83   21 23:36


 


Resp  12   21 23:36


 


BP  150/84 H  21 23:36


 


Pulse Ox  98   21 23:41











Weight: 129 lb 12.8 oz





- Exam


Quality Assessment: DVT Prophylaxis


General: Alert, Oriented, Cooperative


HEENT: PERRLA, Conjunctiva Clear, EACs Clear, EOMI, Hearing Intact (hearing aide

 noted to left ear cananl.), Mucosa Moist & Pink, Nares Patent, Normal Nasal 

Septum, Posterior Pharynx Clear, TMs Clear, Glasses


Neck: Supple, Trachea Midline, 2


Lungs: Clear to Auscultation, Normal Respiratory Effort


Cardiovascular: Regular Rate, Regular Rhythm


GI/Abdominal Exam: Normal Bowel Sounds, Soft, Non-Tender, No Organomegaly, No 

Distention, No Abnormal Bruit, No Mass, Pelvis Stable


 (Male) Exam: Deferred


Rectal (Males) Exam: Deferred


Back Exam: Normal Inspection, Full Range of Motion, NT


Extremities: Normal Inspection, Normal Range of Motion, Non-Tender, No Pedal 

Edema, Normal Capillary Refill


Skin: Warm, Dry, Intact


Neurological: Cranial Nerves Intact, Reflexes Equal Bilateral


Neuro Extensive - Mental Status: Alert, Oriented x3, Normal Mood/Affect, Normal 

Cognition


Neuro Extensive - Motor, Sensory, Reflexes: CN II-XII Intact, Normal Gait, 

Normal Reflexes


Psychiatric: Alert, Normal Affect, Normal Mood





- Patient Data


Lab Results Last 24 hrs: 


                         Laboratory Results - last 24 hr











  21 Range/Units





  21:15 21:15 21:15 


 


WBC  8.3    (4.5-11.0)  K/uL


 


RBC  3.90 L    (4.30-5.90)  M/uL


 


Hgb  11.5 L    (12.0-15.0)  g/dL


 


Hct  34.8 L    (40.0-54.0)  %


 


MCV  89    (80-98)  fL


 


MCH  30    (27-31)  pg


 


MCHC  33    (32-36)  %


 


Plt Count  202    (150-400)  K/uL


 


Neut % (Auto)  76 H    (36-66)  %


 


Lymph % (Auto)  11 L    (24-44)  %


 


Mono % (Auto)  11 H    (2-6)  %


 


Eos % (Auto)  2    (2-4)  %


 


Baso % (Auto)  0    (0-1)  %


 


PT   12.0   (9.5-12.0)  sec


 


INR   1.10   (0.80-1.20)  


 


APTT   28.6   (27.0-36.0)  sec


 


Sodium    135 L  (140-148)  mmol/L


 


Potassium    3.7  (3.6-5.2)  mmol/L


 


Chloride    97 L  (100-108)  mmol/L


 


Carbon Dioxide    25  (21-32)  mmol/L


 


Anion Gap    16.7 H  (5.0-14.0)  mmol/L


 


BUN    11  (7-18)  mg/dL


 


Creatinine    0.8  (0.8-1.3)  mg/dL


 


Est Cr Clr Drug Dosing    59.20  mL/min


 


Estimated GFR (MDRD)    > 60  (>60)  


 


Glucose    139 H  ()  mg/dL


 


Calcium    8.9  (8.5-10.1)  mg/dL


 


Total Bilirubin    1.3 H  (0.2-1.0)  mg/dL


 


AST    142 H  (15-37)  U/L


 


ALT    216 H  (12-78)  U/L


 


Alkaline Phosphatase    264 H  ()  U/L


 


Total Protein    5.9 L  (6.4-8.2)  g/dL


 


Albumin    2.9 L  (3.4-5.0)  g/dL


 


Globulin    3.0  (2.3-3.5)  g/dL


 


Albumin/Globulin Ratio    1.0 L  (1.2-2.2)  


 


Influenza Type A RNA     (NEGATIVE)  


 


RSV RNA (INAAT)     (NEGATIVE)  


 


Influenza Type B RNA     (NEGATIVE)  


 


SARS-CoV-2 RNA (DARWIN)     (NEGATIVE)  


 


Blood Type     


 


Gel Antibody Screen     














  21 Range/Units





  21:15 22:45 


 


WBC    (4.5-11.0)  K/uL


 


RBC    (4.30-5.90)  M/uL


 


Hgb    (12.0-15.0)  g/dL


 


Hct    (40.0-54.0)  %


 


MCV    (80-98)  fL


 


MCH    (27-31)  pg


 


MCHC    (32-36)  %


 


Plt Count    (150-400)  K/uL


 


Neut % (Auto)    (36-66)  %


 


Lymph % (Auto)    (24-44)  %


 


Mono % (Auto)    (2-6)  %


 


Eos % (Auto)    (2-4)  %


 


Baso % (Auto)    (0-1)  %


 


PT    (9.5-12.0)  sec


 


INR    (0.80-1.20)  


 


APTT    (27.0-36.0)  sec


 


Sodium    (140-148)  mmol/L


 


Potassium    (3.6-5.2)  mmol/L


 


Chloride    (100-108)  mmol/L


 


Carbon Dioxide    (21-32)  mmol/L


 


Anion Gap    (5.0-14.0)  mmol/L


 


BUN    (7-18)  mg/dL


 


Creatinine    (0.8-1.3)  mg/dL


 


Est Cr Clr Drug Dosing    mL/min


 


Estimated GFR (MDRD)    (>60)  


 


Glucose    ()  mg/dL


 


Calcium    (8.5-10.1)  mg/dL


 


Total Bilirubin    (0.2-1.0)  mg/dL


 


AST    (15-37)  U/L


 


ALT    (12-78)  U/L


 


Alkaline Phosphatase    ()  U/L


 


Total Protein    (6.4-8.2)  g/dL


 


Albumin    (3.4-5.0)  g/dL


 


Globulin    (2.3-3.5)  g/dL


 


Albumin/Globulin Ratio    (1.2-2.2)  


 


Influenza Type A RNA   Negative  (NEGATIVE)  


 


RSV RNA (INAAT)   Negative  (NEGATIVE)  


 


Influenza Type B RNA   Negative  (NEGATIVE)  


 


SARS-CoV-2 RNA (DARWIN)   Negative  (NEGATIVE)  


 


Blood Type  A NEGATIVE   


 


Gel Antibody Screen  Negative   











Result Diagrams: 


                                 21 21:15





                                 21 21:15





Sepsis Event Note





- Evaluation


Sepsis Screening Result: No Definite Risk





- Focused Exam


Vital Signs: 


                                   Vital Signs











  Temp Pulse Resp BP Pulse Ox


 


 21 23:41      98


 


 21 23:36  97.8 F  83  12  150/84 H  100


 


 21 22:45   72  16  114/90  98


 


 21 21:41   70  18  154/58 H  97


 


 21 20:43  98.0 F  76  20  163/81 H  98














- Problem List


(1) Lower gastrointestinal bleeding


SNOMED Code(s): 53916893


   ICD Code: K92.2 - GASTROINTESTINAL HEMORRHAGE, UNSPECIFIED   Status: Acute   

Priority: High   Current Visit: Yes   





(2) Atrial fibrillation


SNOMED Code(s): 86312429


   ICD Code: I48.91 - UNSPECIFIED ATRIAL FIBRILLATION   Status: Acute   

Priority: High   Current Visit: Yes   


Qualifiers: 


   Atrial fibrillation type: longstanding persistent   Qualified Code(s): I48.11

 - Longstanding persistent atrial fibrillation   


Problem List Initiated/Reviewed/Updated: Yes


Orders Last 24hrs: 


                               Active Orders 24 hr











 Category Date Time Status


 


 Cardiac Monitoring [RC] .As Directed Care  21 21:00 Active


 


 Cardiac Monitoring [RC] CONTINUOUS Care  21 23:41 Active


 


 Head of Bed Elevation [RC] ASDIRECTED Care  21 23:41 Active


 


 Intake and Output [RC] QSHIFT Care  21 23:41 Active


 


 Notify Provider Consults [RC] ASDIRECTED Care  21 23:41 Active


 


 Notify Provider Vital Signs [RC] ASDIRECTED Care  21 23:41 Active


 


 Overnight Pulse Oximetry [RC] Click to Edit Care  21 21:00 Active


 


 Oxygen Therapy [RC] PRN Care  21 23:41 Active


 


 Peripheral IV Care [RC] .AS DIRECTED Care  21 20:59 Active


 


 Pulse Oximetry [RC] PRN Care  21 23:41 Active


 


 RT Aerosol Therapy [RC] ASDIRECTED Care  21 23:41 Active


 


 Up ad Neelima [RC] ASDIRECTED Care  21 23:41 Active


 


 Vital Signs [RC] Q4H Care  21 23:41 Active


 


 Consult to Physician [CONS] Routine Cons  21 23:41 Ordered


 


 Consult to Spiritual Care [CONS] Routine Cons  21 23:41 Active


 


 Nothing per Oral Now Diet [DIET] Diet  21 Dinner Active


 


 CBC WITH AUTO DIFF [HEME] Routine Lab  21 02:10 Ordered


 


 OCCULT BLOOD SCREEN [OP] Urgent Lab  21 00:57 Received


 


 Albuterol [Proventil Neb Soln] Med  21 23:41 Active





 2.5 mg NEB Q4H PRN   


 


 Iron [Iron] Med  21 09:00 Pending





 65 mg PO BID   


 


 Isosorbide Mononitrate [Imdur] Med  21 09:00 Active





 60 mg PO DAILY   


 


 Metoprolol Succinate [Toprol XL] Med  21 09:00 Active





 25 mg PO DAILY   


 


 Morphine Med  21 23:41 Active





 2 mg IVPUSH Q2H PRN   


 


 Ondansetron [Zofran] Med  21 23:41 Active





 4 mg IV Q4H PRN   


 


 Sodium Chloride 0.9% [Normal Saline] 1,000 ml Med  21 23:41 Active





 IV ASDIRECTED   


 


 Sodium Chloride 0.9% [Normal Saline] 100 ml Med  21 23:41 Active





 Pantoprazole [ProTONIX IV***] 80 mg   





 IV 10 mls/hr   


 


 Sodium Chloride 0.9% [Saline Flush] Med  21 23:41 Active





 10 ml FLUSH ASDIRECTED PRN   


 


 Sotalol [Betapace] Med  21 09:00 Active





 40 mg PO BID   


 


 amLODIPine [Norvasc] Med  21 09:00 Active





 5 mg PO DAILY   


 


 Isolation [COMM] Stat Ot  21 22:46 Ordered


 


 Peripheral IV Insertion Adult [OM.PC] Urgent Oth  21 20:58 Ordered


 


 Pulse Oximetry Continuous Monitoring [OM.PC] Routine Oth  21 21:00 

Ordered


 


 Saline Lock Insert [OM.PC] Routine Ot  21 23:41 Ordered


 


 Sequential Compression Device [OM.PC] Per Unit Routine Ot  21 23:41 

Ordered


 


 Resuscitation Status Routine Resus Stat  21 22:51 Ordered








                                Medication Orders





Albuterol (Albuterol 0.083% 2.5 Mg/3 Ml Neb Soln)  2.5 mg NEB Q4H PRN


   PRN Reason: Shortness Of Breath/wheezing


Amlodipine Besylate (Amlodipine 5 Mg Tab)  5 mg PO DAILY [facility]


Pantoprazole Sodium 80 mg/ (Sodium Chloride)  100 mls @ 10 mls/hr IV .Q10H [facility]AL[facility]


Sodium Chloride (Normal Saline)  1,000 mls @ 125 mls/hr IV ASDIRECTED [facility]


   Last Admin: 21 00:53  Dose: 125 mls/hr


   Documented by: JOSIAS


Isosorbide Mononitrate (Isosorbide Mononitrate 30 Mg Tab.Er)  60 mg PO DAILY [facility]


Metoprolol Succinate (Metoprolol Succinate 25 Mg Tab.Er)  25 mg PO DAILY [facility]


Morphine Sulfate (Morphine 2 Mg/Ml Syringe)  2 mg IVPUSH Q2H PRN


   PRN Reason: Pain (severe 7-10)


Non-Formulary Medication (Iron [Iron])  65 mg PO BID [facility]


Ondansetron HCl (Ondansetron 4 Mg/2 Ml Sdv)  4 mg IV Q4H PRN


   PRN Reason: Nausea/Vomiting


Sodium Chloride (Sodium Chloride 0.9% 10 Ml Syringe)  10 ml FLUSH ASDIRECTED PRN


   PRN Reason: Keep Vein Open


Sotalol HCl (Sotalol 80 Mg Tab)  40 mg PO BID [facility]








Assessment/Plan Comment:: 








Assessment/Plan Comment:: 





ASSESSMENT AND PLAN - Gastrointestinal bleeding.





Lower GI hemorrhage- reports 3 episode of bright red rectal bleeding today. Was 

seen in ER yesterday had workup see ER vist 2021, hemoglobin 13 now at 11. 

He report last 10/2020 had a colonscopy, Endoscopy and barium enema. Did not 

find a cause for rectal bleeding. Consult Dr. Hernandes at 22:05, schedule for upper

 and lower endoscopy tomorrow. 


-IV Protonix 80 mg bolus, then start IV Protonix drip 


-IV fluids overnight


-Pain and nausea management


-bowel prep


-2 IV access sites.


-Type an screen for blood products


-Surgical consultation with Dr. Hernandes





Atrial Fib. chronic


-telemetry


-Eliquis 5 mg po bid- hold for surgery in am


-am labs.





Maintenance issues - 


- DVT prophylaxis -SCD


- GI prophylaxis -IV PPI


- Nutrition -nothing by mouth


- Mondragon catheter -not indicated at this time


-consult to [facility] care





CODE STATUS -full code





Admission justification -this patient will be admitted for inpatient services 

and is medically appropriate meeting medical necessity for inpatient admission 

as outlined in my documentation.  I reasonably expect the patient will require 

inpatient services that span a period time over 2 midnights. I reasonably expect

 this patient to be discharged or transferred within 96 hours after admission to

 the [facility].





Disposition -I would anticipate discharge home after the hospital stay





Primary care physician -Dr. Montenegro





Hospitalist- Laith Mandujano M.D.





- Mortality Measure


Prognosis:: Good











- Mortality Measure


Prognosis:: Good

## 2021-04-15 RX ADMIN — SODIUM CHLORIDE SCH MLS/HR: 9 INJECTION, SOLUTION INTRAVENOUS at 01:02

## 2021-04-15 RX ADMIN — NYSTATIN SCH ML: 500000 SUSPENSION ORAL at 11:28

## 2021-04-15 RX ADMIN — MAGNESIUM SULFATE IN WATER SCH MLS/HR: 40 INJECTION, SOLUTION INTRAVENOUS at 08:38

## 2021-04-15 RX ADMIN — MAGNESIUM SULFATE IN WATER SCH MLS/HR: 40 INJECTION, SOLUTION INTRAVENOUS at 14:11

## 2021-04-15 RX ADMIN — NYSTATIN SCH ML: 500000 SUSPENSION ORAL at 06:02

## 2021-04-15 RX ADMIN — NYSTATIN SCH ML: 500000 SUSPENSION ORAL at 16:22

## 2021-04-15 RX ADMIN — NYSTATIN SCH ML: 500000 SUSPENSION ORAL at 22:20

## 2021-04-15 RX ADMIN — MAGNESIUM SULFATE IN WATER SCH MLS/HR: 40 INJECTION, SOLUTION INTRAVENOUS at 20:25

## 2021-04-15 NOTE — PN
DATE OF SERVICE:  04/15/2021

 

SUBJECTIVE:  Contreras had an EGD and colonoscopy yesterday.  He did have some polyps and

diverticulitis, with the  colonoscopy could not get above the sigmoid colon.  His EGD showed

some fungal infection in his esophagus with a black skin lesion in his abdomen, which was

biopsied.  He has had no further rectal bleeding.  His potassium was 3.4 and magnesium 1.6.

BNP was 1752.

 

Remainder of review of systems negative for any pertinent positives or negatives.

 

OBJECTIVE:  GENERAL: Contreras is a pleasant 85-year-old male, he is alert and orientated.

VITAL SIGNS:  TPR is 96.6, 64, 16. Blood pressure 175/66.

HEENT: Negative.

NECK:  Supple.

HEART: Regular rate and rhythm.

LUNGS:  Clear.

ABDOMEN: Soft, nontender.

EXTREMITIES: Without peripheral edema.

 

ASSESSMENT:

1. Status post esophagogastroduodenoscopy and colonoscopy 04/14/2021.  Surgeon:  Robbie Hernandes MD.

2. Diverticulitis.

3. Esophageal candidiasis.

4. Black lesion in the stomach wall.

 

PLAN:

1. Regular diet.

2. Protonix 40 mg p.o. b.i.d.

3. KCl 40 mEq oral one time for potassium 3.4.

4. Magnesium 2 g IV q.6 hours x72 hours.

5. Discharge plans/decision to be made by Laith Mandujano MD, hospitalist.

 

 

 

 

Gudelia Galarza PA-C

DD:  04/15/2021 07:56:50

DT:  04/15/2021 08:29:05

Job #:  [id number]

## 2021-04-15 NOTE — PCM.PN
- General Info


Date of Service: 04/15/21


Subjective Update: 





Mr. Pollard has been stable over the past 24 hours.  EGD did show evidence of 

inflammation as well as probable esophagitis with yeast infection.  He is 

feeling better with no further evidence of active bleeding.  Tolerating current 

diet without difficulty.


Functional Status: Reports: Tolerating Diet, Ambulating, Urinating





- Review of Systems


General: Reports: No Symptoms


Pulmonary: Reports: No Symptoms


Cardiovascular: Reports: No Symptoms


Gastrointestinal: Reports: No Symptoms





- Patient Data


Vitals - Most Recent: 


                                Last Vital Signs











Temp  97.1 F   04/15/21 12:52


 


Pulse  60   04/15/21 12:52


 


Resp  16   04/15/21 12:52


 


BP  119/76   04/15/21 12:52


 


Pulse Ox  96   04/15/21 12:52











Weight - Most Recent: 129 lb 12.79 oz


I&O - Last 24 Hours: 


                                 Intake & Output











 04/15/21 04/15/21 04/15/21





 06:59 14:59 22:59


 


Intake Total 2363  


 


Output Total 1650  


 


Balance 713  











Lab Results Last 24 Hours: 


                         Laboratory Results - last 24 hr











  04/13/21 04/14/21 04/15/21 Range/Units





  21:15 17:03 04:58 


 


WBC    5.9  (4.5-11.0)  K/uL


 


RBC    4.18 L  (4.30-5.90)  M/uL


 


Hgb   12.4  12.1  (12.0-15.0)  g/dL


 


Hct    37.3 L  (40.0-54.0)  %


 


MCV    89  (80-98)  fL


 


MCH    29  (27-31)  pg


 


MCHC    32  (32-36)  %


 


Plt Count    218  (150-400)  K/uL


 


Sodium     (140-148)  mmol/L


 


Potassium     (3.6-5.2)  mmol/L


 


Chloride     (100-108)  mmol/L


 


Carbon Dioxide     (21-32)  mmol/L


 


Anion Gap     (5.0-14.0)  mmol/L


 


BUN     (7-18)  mg/dL


 


Creatinine     (0.8-1.3)  mg/dL


 


Est Cr Clr Drug Dosing     mL/min


 


Estimated GFR (MDRD)     (>60)  


 


Glucose     ()  mg/dL


 


Calcium     (8.5-10.1)  mg/dL


 


Phosphorus     (2.5-4.9)  mg/dL


 


Magnesium     (1.8-2.4)  mg/dL


 


Total Bilirubin     (0.2-1.0)  mg/dL


 


AST     (15-37)  U/L


 


ALT     (12-78)  U/L


 


Alkaline Phosphatase     ()  U/L


 


NT-Pro-B Natriuret Pep  1075 H    (5-450)  pg/mL


 


Total Protein     (6.4-8.2)  g/dL


 


Albumin     (3.4-5.0)  g/dL


 


Globulin     (2.3-3.5)  g/dL


 


Albumin/Globulin Ratio     (1.2-2.2)  














  04/15/21 Range/Units





  04:58 


 


WBC   (4.5-11.0)  K/uL


 


RBC   (4.30-5.90)  M/uL


 


Hgb   (12.0-15.0)  g/dL


 


Hct   (40.0-54.0)  %


 


MCV   (80-98)  fL


 


MCH   (27-31)  pg


 


MCHC   (32-36)  %


 


Plt Count   (150-400)  K/uL


 


Sodium  138 L  (140-148)  mmol/L


 


Potassium  3.4 L  (3.6-5.2)  mmol/L


 


Chloride  101  (100-108)  mmol/L


 


Carbon Dioxide  26  (21-32)  mmol/L


 


Anion Gap  14.4 H  (5.0-14.0)  mmol/L


 


BUN  4 L D  (7-18)  mg/dL


 


Creatinine  0.7 L  (0.8-1.3)  mg/dL


 


Est Cr Clr Drug Dosing  64.25  mL/min


 


Estimated GFR (MDRD)  > 60  (>60)  


 


Glucose  120 H  ()  mg/dL


 


Calcium  8.9  (8.5-10.1)  mg/dL


 


Phosphorus  2.8  (2.5-4.9)  mg/dL


 


Magnesium  1.6 L  (1.8-2.4)  mg/dL


 


Total Bilirubin  0.6  D  (0.2-1.0)  mg/dL


 


AST  49 H  (15-37)  U/L


 


ALT  144 H  (12-78)  U/L


 


Alkaline Phosphatase  255 H  ()  U/L


 


NT-Pro-B Natriuret Pep  1752 H  (5-450)  pg/mL


 


Total Protein  6.2 L  (6.4-8.2)  g/dL


 


Albumin  3.0 L  (3.4-5.0)  g/dL


 


Globulin  3.2  (2.3-3.5)  g/dL


 


Albumin/Globulin Ratio  0.9 L  (1.2-2.2)  











Zhang Results Last 24 Hours: 


                                  Microbiology











 04/14/21 14:48 CLOtest - Final





 Stomach    NEGATIVE CLOTEST





    REFERENCE RANGE: NEGATIVE











Med Orders - Current: 


                               Current Medications





Albuterol (Albuterol 0.083% 2.5 Mg/3 Ml Neb Soln)  2.5 mg NEB Q4H PRN


   PRN Reason: Shortness Of Breath/wheezing


Amlodipine Besylate (Amlodipine 5 Mg Tab)  5 mg PO DAILY [facility]


   Last Admin: 04/15/21 08:38 Dose:  5 mg


   Documented by: 


Ferrous Sulfate (Ferrous Sulfate 325 Mg Tab)  325 mg PO BIDMEALS [facility]


   Last Admin: 04/15/21 08:37 Dose:  325 mg


   Documented by: 


Magnesium Sulfate (Magnesium Sulfate In Water 2 Gm/50 Ml)  2 gm in 50 mls @ 25 

mls/hr IV Q6H [facility]


   Stop: 04/18/21 08:01


   Last Admin: 04/15/21 14:11 Dose:  25 mls/hr


   Documented by: 


Isosorbide Mononitrate (Isosorbide Mononitrate 30 Mg Tab.Er)  60 mg PO DAILY [facility]


   Last Admin: 04/15/21 08:43 Dose:  60 mg


   Documented by: 


Metoprolol Succinate (Metoprolol Succinate 25 Mg Tab.Er)  25 mg PO DAILY [facility]


   Last Admin: 04/15/21 08:38 Dose:  25 mg


   Documented by: 


Morphine Sulfate (Morphine 2 Mg/Ml Syringe)  2 mg IVPUSH Q2H PRN


   PRN Reason: Pain (severe 7-10)


Nystatin (Nystatin Susp 100,000 Unit/Ml 5 Ml Ud Cup)  5 ml PO QID [facility]


   Last Admin: 04/15/21 11:28 Dose:  5 ml


   Documented by: 


Ondansetron HCl (Ondansetron 4 Mg/2 Ml Sdv)  4 mg IV Q4H PRN


   PRN Reason: Nausea/Vomiting


Pantoprazole Sodium (Pantoprazole 40 Mg Tab.Cr)  40 mg PO BIDAC [facility]


   Last Admin: 04/15/21 08:37 Dose:  40 mg


   Documented by: 


Potassium Chloride (Potassium Chloride 20 Meq Tab.Er)  40 meq PO ONETIME ONE


   Stop: 04/15/21 17:01


Sodium Chloride (Sodium Chloride 0.9% 10 Ml Syringe)  10 ml FLUSH ASDIRECTED PRN


   PRN Reason: Keep Vein Open


Sotalol HCl (Sotalol 80 Mg Tab)  40 mg PO BID [facility]


   Last Admin: 04/15/21 08:38 Dose:  40 mg


   Documented by: 





Discontinued Medications





Bisacodyl (Bisacodyl 5 Mg Tab)  10 mg PO ONETIME ONE


   Stop: 04/14/21 00:09


   Last Admin: 04/14/21 02:05 Dose:  10 mg


   Documented by: 


Fentanyl (Fentanyl 100 Mcg/2 Ml Sdv) Confirm Administered Dose 100 mcg .ROUTE 

.STK-MED ONE


   Stop: 04/14/21 11:45


Pantoprazole Sodium 80 mg/ (Sodium Chloride)  100 mls @ 200 mls/hr IV ONETIME 

ONE


   Stop: 04/14/21 00:10


   Last Admin: 04/14/21 00:53 Dose:  200 mls/hr


   Documented by: 


Pantoprazole Sodium 80 mg/ (Sodium Chloride)  100 mls @ 10 mls/hr IV .Q10H [facility]


   Stop: 04/14/21 12:00


   Last Admin: 04/14/21 13:13 Dose:  Not Given


   Documented by: 


Sodium Chloride (Normal Saline)  1,000 mls @ 125 mls/hr IV ASDIRECTED [facility]


   Last Admin: 04/14/21 09:21 Dose:  125 mls/hr


   Documented by: 


Pantoprazole Sodium 80 mg/ (Sodium Chloride)  100 mls @ 10 mls/hr IV .Q10H [facility]


   Last Admin: 04/15/21 01:02 Dose:  10 mls/hr


   Documented by: 


Lactated Ringer's (Ringers, Lactated) Confirm Administered Dose 1,000 mls @ as 

directed .ROUTE .STK-MED ONE


   Stop: 04/14/21 15:07


Sodium Chloride (Normal Saline)  70 mls @ 3 mls/sec IV ASDIRECTED STA


   Stop: 04/15/21 04:46


   Last Admin: 04/15/21 05:15 Dose:  3 mls/sec


   Documented by: 


Iohexol (Iohexol 647 Mg/Ml 50 Ml Sdv)  50 ml PO .ASDIRECTED [facility]EM


   Stop: 04/15/21 12:00


Iopamidol (Iopamidol 612 Mg/Ml 50 Ml Sdv) Confirm Administered Dose 50 ml .ROUTE

.STK-MED ONE


   Stop: 04/15/21 03:35


   Last Admin: 04/15/21 03:43 Dose:  50 ml


   Documented by: 


Iopamidol (Iopamidol 612 Mg/Ml 100 Ml Bottle)  100 ml IV .AS DIRECTED [facility]


   Stop: 04/15/21 04:45


   Last Admin: 04/15/21 05:02 Dose:  Not Given


   Documented by: 


Iopamidol (Iopamidol 612 Mg/Ml 100 Ml Bottle)  100 ml IV .AS DIRECTED STA


   Stop: 04/15/21 05:15


   Last Admin: 04/15/21 05:17 Dose:  100 ml


   Documented by: 


Polyethylene Glycol (Polyethylene Glycol 3350 Powder 238 Gm Bot)  238 gm PO 

ONETIME ONE


   Stop: 04/14/21 00:07


   Last Admin: 04/14/21 00:24 Dose:  238 gram


   Documented by: 


Potassium Chloride (Potassium Chloride 20 Meq Tab.Er)  40 meq PO ONETIME ONE


   Stop: 04/15/21 08:01


   Last Admin: 04/15/21 08:37 Dose:  40 meq


   Documented by: 


Propofol (Propofol 200 Mg/20 Ml Sdv) Confirm Administered Dose 200 mg .ROUTE 

.STK-MED ONE


   Stop: 04/14/21 11:45


Propofol (Propofol 200 Mg/20 Ml Sdv) Confirm Administered Dose 200 mg .ROUTE .S

TK-MED ONE


   Stop: 04/14/21 14:59


Sodium Chloride (Sodium Chloride 0.9% 10 Ml Syringe)  10 ml FLUSH ASDIRECTED PRN


   PRN Reason: Keep Vein Open


   Last Admin: 04/13/21 21:37 Dose:  10 ml


   Documented by: 











- Exam


Quality Assessment: DVT Prophylaxis


General: Alert, Oriented, Cooperative, No Acute Distress


Lungs: Clear to Auscultation, Normal Respiratory Effort


Cardiovascular: Regular Rate, Regular Rhythm, No Murmurs


GI/Abdominal Exam: Soft, Non-Tender, No Organomegaly, No Distention


Extremities: Non-Tender, No Pedal Edema





- Patient Data


Lab Results Last 24 hrs: 


                         Laboratory Results - last 24 hr











  04/13/21 04/14/21 04/15/21 Range/Units





  21:15 17:03 04:58 


 


WBC    5.9  (4.5-11.0)  K/uL


 


RBC    4.18 L  (4.30-5.90)  M/uL


 


Hgb   12.4  12.1  (12.0-15.0)  g/dL


 


Hct    37.3 L  (40.0-54.0)  %


 


MCV    89  (80-98)  fL


 


MCH    29  (27-31)  pg


 


MCHC    32  (32-36)  %


 


Plt Count    218  (150-400)  K/uL


 


Sodium     (140-148)  mmol/L


 


Potassium     (3.6-5.2)  mmol/L


 


Chloride     (100-108)  mmol/L


 


Carbon Dioxide     (21-32)  mmol/L


 


Anion Gap     (5.0-14.0)  mmol/L


 


BUN     (7-18)  mg/dL


 


Creatinine     (0.8-1.3)  mg/dL


 


Est Cr Clr Drug Dosing     mL/min


 


Estimated GFR (MDRD)     (>60)  


 


Glucose     ()  mg/dL


 


Calcium     (8.5-10.1)  mg/dL


 


Phosphorus     (2.5-4.9)  mg/dL


 


Magnesium     (1.8-2.4)  mg/dL


 


Total Bilirubin     (0.2-1.0)  mg/dL


 


AST     (15-37)  U/L


 


ALT     (12-78)  U/L


 


Alkaline Phosphatase     ()  U/L


 


NT-Pro-B Natriuret Pep  1075 H    (5-450)  pg/mL


 


Total Protein     (6.4-8.2)  g/dL


 


Albumin     (3.4-5.0)  g/dL


 


Globulin     (2.3-3.5)  g/dL


 


Albumin/Globulin Ratio     (1.2-2.2)  














  04/15/21 Range/Units





  04:58 


 


WBC   (4.5-11.0)  K/uL


 


RBC   (4.30-5.90)  M/uL


 


Hgb   (12.0-15.0)  g/dL


 


Hct   (40.0-54.0)  %


 


MCV   (80-98)  fL


 


MCH   (27-31)  pg


 


MCHC   (32-36)  %


 


Plt Count   (150-400)  K/uL


 


Sodium  138 L  (140-148)  mmol/L


 


Potassium  3.4 L  (3.6-5.2)  mmol/L


 


Chloride  101  (100-108)  mmol/L


 


Carbon Dioxide  26  (21-32)  mmol/L


 


Anion Gap  14.4 H  (5.0-14.0)  mmol/L


 


BUN  4 L D  (7-18)  mg/dL


 


Creatinine  0.7 L  (0.8-1.3)  mg/dL


 


Est Cr Clr Drug Dosing  64.25  mL/min


 


Estimated GFR (MDRD)  > 60  (>60)  


 


Glucose  120 H  ()  mg/dL


 


Calcium  8.9  (8.5-10.1)  mg/dL


 


Phosphorus  2.8  (2.5-4.9)  mg/dL


 


Magnesium  1.6 L  (1.8-2.4)  mg/dL


 


Total Bilirubin  0.6  D  (0.2-1.0)  mg/dL


 


AST  49 H  (15-37)  U/L


 


ALT  144 H  (12-78)  U/L


 


Alkaline Phosphatase  255 H  ()  U/L


 


NT-Pro-B Natriuret Pep  1752 H  (5-450)  pg/mL


 


Total Protein  6.2 L  (6.4-8.2)  g/dL


 


Albumin  3.0 L  (3.4-5.0)  g/dL


 


Globulin  3.2  (2.3-3.5)  g/dL


 


Albumin/Globulin Ratio  0.9 L  (1.2-2.2)  











Result Diagrams: 


                                 04/15/21 04:58





                                 04/15/21 04:58


Zhang Results Last 24 hrs: 


                                  Microbiology











 04/14/21 14:48 CLOtest - Final





 Stomach    NEGATIVE CLOTEST





    REFERENCE RANGE: NEGATIVE














Sepsis Event Note





- Evaluation


Sepsis Screening Result: No Definite Risk





- Focused Exam


Vital Signs: 


                                   Vital Signs











  Temp Pulse Pulse Resp BP BP Pulse Ox


 


 04/15/21 12:52  97.1 F   60  16   119/76  96


 


 04/15/21 08:43      180/70 H  


 


 04/15/21 08:38   70    180/70 H  


 


 04/15/21 08:00  97.1 F   65  16   180/70 H  96














- Problem List Review


Problem List Initiated/Reviewed/Updated: Yes





- My Orders


Last 24 Hours: 


My Active Orders





04/15/21 15:42


Discontinue Telemetry Monitoring [Cardiac Monitoring Discontinue] [RC] Click to 

Edit 





04/15/21 17:00


Potassium Chloride [Klor-Con M20]   40 meq PO ONETIME ONE 





04/16/21 05:00


BASIC METABOLIC PANEL,BMP [CHEM] Timed 





04/16/21 05:11


HGB [HEMOGLOBIN] [HEME] AM 














- Plan


Plan:: 


 


ASSESSMENT AND PLAN 





GI hemorrhage-evidence of esophagitis with yeast infection noted on EGD


-Protonix 40 mg twice daily


-Saline lock IV


-Serial hemoglobin levels


-Nystatin as ordered by Dr. Hernandes


-Surgical follow-up per Dr. Hernandes





Atrial Fib. chronic


-telemetry


-Eliquis 5 mg po bid- hold 


-am labs.





Maintenance issues - 


- DVT prophylaxis -SCD


- GI prophylaxis -IV PPI


- Nutrition -nothing by mouth


- Mondragon catheter -not indicated at this time


-consult to [facility] care





CODE STATUS -full code





Admission justification -this patient will be admitted for inpatient services 

and is medically appropriate meeting medical necessity for inpatient admission 

as outlined in my documentation.  I reasonably expect the patient will require 

inpatient services that span a period time over 2 midnights. I reasonably expect

 this patient to be discharged or transferred within 96 hours [facility]





Disposition -I would anticipate discharge home after the hospital stay





Primary care physician -Dr. Montenegro





Hospitalist- Laith Mandujano M.D.

## 2021-04-15 NOTE — CRLCT
INDICATION:



Rectal bleeding. History of prostate cancer with radiation treatment.



TECHNIQUE:



CT abdomen and pelvis acquired with 100 cc Isovue-300 IV contrast.



COMPARISON:



April 12, 2021. 



FINDINGS:



Lower chest: Unremarkable. 



Liver: Unremarkable. Normal in size and attenuation. No masses. 



Gallbladder and bile ducts: Status post cholecystectomy. 



Pancreas: Unremarkable. No mass or inflammation. 



Spleen: Unremarkable. Normal in size. No masses. 



Adrenal glands: Unremarkable. No nodules. 



Kidneys: Unremarkable. No masses, stones, or hydronephrosis. 



GI tract: There is sigmoid diverticulosis. Moderate wall thickening is 

again demonstrated in the sigmoid colon amongst the diverticula. However 

there are no signs of acute inflammation. Remainder of the GI tract is 

normal in caliber and appearance. Normal appendix.



Vasculature: Diffuse aortic atherosclerosis with moderate ectasia. 

Mesenteric arteries are patent.



Lymph nodes: No lymphadenopathy. 



Omentum/Peritoneum/Abdominal Wall: Unremarkable. No sign of mass or 

infiltration. No free air or significant free fluid. 



Pelvis: Unremarkable. 



Bones: Unremarkable for age. 



IMPRESSION:



1. Moderate wall thickening present amongst multiple diverticula in the 

sigmoid colon is similar to the prior exam. This could represent changes 

from chronic diverticulitis. There is no sign of acute inflammation. 



2. No other acute or specific finding to explain rectal bleeding. Overall, 

no significant changes from the prior exam.



Please note that all CT scans at this facility use dose modulation, 

iterative reconstruction, and/or weight-based dosing when appropriate to 

reduce radiation dose to as low as reasonably achievable.



Dictated by Edward Flannery MD @ Apr 15 2021  5:55AM



Signed by Dr. Edward Flannery @ Apr 15 2021  6:05AM

## 2021-04-16 VITALS — HEART RATE: 52 BPM | SYSTOLIC BLOOD PRESSURE: 140 MMHG | DIASTOLIC BLOOD PRESSURE: 65 MMHG

## 2021-04-16 RX ADMIN — MAGNESIUM SULFATE IN WATER SCH MLS/HR: 40 INJECTION, SOLUTION INTRAVENOUS at 03:33

## 2021-04-16 RX ADMIN — NYSTATIN SCH ML: 500000 SUSPENSION ORAL at 09:49

## 2021-04-16 RX ADMIN — MAGNESIUM SULFATE IN WATER SCH MLS/HR: 40 INJECTION, SOLUTION INTRAVENOUS at 07:12

## 2021-04-16 RX ADMIN — NYSTATIN SCH ML: 500000 SUSPENSION ORAL at 05:53

## 2021-04-16 NOTE — [id number].[id number]
**Discharge Summary





- Hospital Course


Brief History: Mr. Pollard is an 85-year-old gentleman who was admitted through 

the emergency department for further evaluation and management of GI bleed.





- Discharge Data


Discharge Date: 04/16/21


Discharge Disposition: Home, Self-Care 01


Condition: Good





- Referral to Home Health


Primary Care Physician: 


Mike Montenegro MD








- Discharge Diagnosis/Problem(s)


(1) GI bleed


SNOMED Code(s): 84361103


   ICD Code: K92.2 - GASTROINTESTINAL HEMORRHAGE, UNSPECIFIED   Status: Acute   

Current Visit: Yes   





(2) Acute blood loss anemia


SNOMED Code(s): 632513401


   ICD Code: D62 - ACUTE POSTHEMORRHAGIC ANEMIA   Status: Acute   Current Visit:

Yes   





(3) Esophagitis


SNOMED Code(s): 44219030


   ICD Code: K20.90 - ESOPHAGITIS, UNSPECIFIED WITHOUT BLEEDING   Status: Acute 

 Current Visit: Yes   





(4) COPD (chronic obstructive pulmonary disease)


SNOMED Code(s): 44700234


   ICD Code: J44.9 - CHRONIC OBSTRUCTIVE PULMONARY DISEASE, UNSPECIFIED   

Status: Chronic   Current Visit: No   





(5) Type 2 diabetes mellitus


SNOMED Code(s): 11772652


   ICD Code: E11.9 - TYPE 2 DIABETES MELLITUS WITHOUT COMPLICATIONS   Status: 

Chronic   Current Visit: No   





- Patient Summary/Data


Consults: 


                                  Consultations





04/13/21 23:41


Consult to Physician [CONS] Routine 


   Consulting Provider: Robbie Hernandes Call Completed to Consulting Physician: Yes


   Reason for Consult: lower GI bleed- upper and lower endoscopy in am


   Person Notified: Dr. Hernandes


   Date Notified: 04/13/21


   Time Notified: 22:05


Consult to Spiritual Care [CONS] Routine 











Hospital Course: 





Mr. Pollard is an 85-year-old gentleman who was admitted through the emergency 

department after experiencing melenic stools as well as hematochezia at home.  

After arriving in the emergency department he had another stool with 

hematochezia.  Initial hemoglobin level was found to be within desired range.  

He reports prior history of GI bleed and previous evaluations with no identified

 source of blood loss.  He was given an 80 mg bolus of Protonix in the emergency

 department and started on a continuous infusion of Protonix at 8 mg/h.  2 IV 

sites were established and he was given IV fluids for hydration.  Serial 

hemoglobin levels were obtained and he was kept n.p.o.  Hemoglobin remained 

fairly stable with mild drop through the night.  He was seen the following m

orning by Dr. Hernandes, EGD and colonoscopy were performed.  He was noted to have 

sigmoid diverticuli, the colonoscope was unable to be advanced past the sigmoid 

colon.  EGD did show evidence of esophagitis suspicious for underlying yeast 

infection as well as a black area in the gastric wall that was biopsied.  Biopsy

 results are pending at the time of this dictation.  Because of the incomplete 

colonoscopy CT scan of the abdomen was obtained and showed evidence of 

diverticulosis and some chronic thickening of the colon wall.  He remained 

stable through the rest of his hospital stay and was transitioned to oral 

Protonix.  He will be discharged home with Protonix 40 mg twice daily for 2 

weeks and then once daily thereafter.  Activity will be as tolerated and he will

 be on a soft diet.  Follow-up appointment will be scheduled with his primary 

care provider within 1 week.  Follow-up appointment will also be scheduled with 

the surgical service.





- Patient Instructions


Diet: GI Soft/Low Residue/Low Fiber


Activity: As Tolerated





- Discharge Plan


*PRESCRIPTION DRUG MONITORING PROGRAM REVIEWED*: Not Applicable


*COPY OF PRESCRIPTION DRUG MONITORING REPORT IN PATIENT VANNESA: Not Applicable


Prescriptions/Med Rec: 


Nystatin [Mycostatin] 5 ml PO QID #60 ml


Pantoprazole [ProTONIX***] 40 mg PO BIDAC #30 tab.cr


Home Medications: 


                                    Home Meds





Aspirin 81 mg PO BEDTIME 05/19/14 [History]


Calcium Carbonate 1,500 mg PO BIDM 05/19/14 [History]


Cholecalciferol (Vitamin D3) [Vitamin D3] 2,000 unit PO BID 05/19/14 [History]


Fluticasone Propionate [Flonase] 2 sprays [employer] DAILY 05/19/14 [History]


Metoprolol Succinate [Toprol XL] 25 mg PO DAILY 05/19/14 [History]


Omeprazole [Prilosec] 20 mg PO BID 05/19/14 [History]


atorvaSTATin [Lipitor] 20 mg PO BEDTIME 05/19/14 [History]


Isosorbide Mononitrate [Imdur] 60 mg PO DAILY 07/23/18 [History]


amLODIPine Besylate [Norvasc] 5 mg PO DAILY 07/23/18 [History]


hydrALAZINE [Apresoline] 50 mg PO TID 07/23/18 [History]


Albuterol Sulfate [Proair Hfa] 2 puff IH Q6HR PRN 10/02/20 [History]


Iron 65 mg PO BID 10/02/20 [History]


Sotalol [Betapace] 40 mg PO BID 10/02/20 [History]


traMADol [Ultram] 50 mg PO Q8HR PRN 10/02/20 [History]


Apixaban [Eliquis] 5 mg PO BID 10/05/20 [History]


Nystatin [Mycostatin] 5 ml PO QID #60 ml 04/16/21 [Rx]


Pantoprazole [ProTONIX***] 40 mg PO BIDAC #30 tab.cr 04/16/21 [Rx]








Patient Handouts:  Diverticulosis


Referrals: 


Gudelia Galarza PA-C [Physician Assistant] - 04/21/21 2:00 pm


Mike Montenegro MD [Primary Care Provider] - 04/22/21 11:30 am (Your 

appointment with Dr. Montenegro will be at the [facility].)





- Discharge Summary/Plan Comment


DC Time >30 min.: No





- Patient Data


Vitals - Most Recent: 


                                Last Vital Signs











Temp  95.1 F L  04/16/21 11:21


 


Pulse  52 L  04/16/21 11:21


 


Resp  18   04/16/21 11:21


 


BP  140/65   04/16/21 11:21


 


Pulse Ox  98   04/16/21 11:21











Weight - Most Recent: 129 lb 12.79 oz


I&O - Last 24 hours: 


                                 Intake & Output











 04/15/21 04/16/21 04/16/21





 22:59 06:59 14:59


 


Intake Total 1200 350 940


 


Balance 1200 350 940











Lab Results - Last 24 hrs: 


                         Laboratory Results - last 24 hr











  04/16/21 04/16/21 Range/Units





  04:28 04:28 


 


WBC  7.3   (4.5-11.0)  K/uL


 


RBC  4.07 L   (4.30-5.90)  M/uL


 


Hgb  11.6 L   (12.0-15.0)  g/dL


 


Hct  36.0 L   (40.0-54.0)  %


 


MCV  89   (80-98)  fL


 


MCH  29   (27-31)  pg


 


MCHC  32   (32-36)  %


 


Plt Count  237   (150-400)  K/uL


 


Sodium   137 L  (140-148)  mmol/L


 


Potassium   4.6  (3.6-5.2)  mmol/L


 


Chloride   102  (100-108)  mmol/L


 


Carbon Dioxide   27  (21-32)  mmol/L


 


Anion Gap   12.6  (5.0-14.0)  mmol/L


 


BUN   10  D  (7-18)  mg/dL


 


Creatinine   0.7 L  (0.8-1.3)  mg/dL


 


Est Cr Clr Drug Dosing   64.25  mL/min


 


Estimated GFR (MDRD)   > 60  (>60)  


 


Glucose   180 H  ()  mg/dL


 


Calcium   8.5  (8.5-10.1)  mg/dL


 


Phosphorus   2.5  (2.5-4.9)  mg/dL


 


Total Bilirubin   0.3  (0.2-1.0)  mg/dL


 


AST   23  (15-37)  U/L


 


ALT   99 H  (12-78)  U/L


 


Alkaline Phosphatase   216 H  ()  U/L


 


NT-Pro-B Natriuret Pep   1225 H  (5-450)  pg/mL


 


Total Protein   5.8 L  (6.4-8.2)  g/dL


 


Albumin   2.7 L  (3.4-5.0)  g/dL


 


Globulin   3.1  (2.3-3.5)  g/dL


 


Albumin/Globulin Ratio   0.9 L  (1.2-2.2)  











[facility] Results - Last 24 hrs: 


                                  Microbiology











 04/14/21 00:57 Stool Occult Blood ([facility]) - Final





 Stool / Feces 


 


 04/14/21 14:48 CLOtest - Final





 Stomach    NEGATIVE CLOTEST





    REFERENCE RANGE: NEGATIVE











Med Orders - Current: 


                               Current Medications





Albuterol (Albuterol 0.083% 2.5 Mg/3 Ml Neb Soln)  2.5 mg NEB Q4H PRN


   PRN Reason: Shortness Of Breath/wheezing


Amlodipine Besylate (Amlodipine 5 Mg Tab)  5 mg PO DAILY [facility]


   Last Admin: 04/16/21 09:46 Dose:  5 mg


   Documented by: 


Ferrous Sulfate (Ferrous Sulfate 325 Mg Tab)  325 mg PO BIDMEALS [facility]


   Last Admin: 04/16/21 07:12 Dose:  325 mg


   Documented by: 


Magnesium Sulfate (Magnesium Sulfate In Water 2 Gm/50 Ml)  2 gm in 50 mls @ 25 

mls/hr IV Q6H [facility]


   Stop: 04/18/21 08:01


   Last Admin: 04/16/21 07:12 Dose:  25 mls/hr


   Documented by: 


Isosorbide Mononitrate (Isosorbide Mononitrate 30 Mg Tab.Er)  60 mg PO DAILY [facility]


   Last Admin: 04/16/21 09:48 Dose:  60 mg


   Documented by: 


Metoprolol Succinate (Metoprolol Succinate 25 Mg Tab.Er)  25 mg PO DAILY [facility]


   Last Admin: 04/16/21 09:44 Dose:  25 mg


   Documented by: 


Morphine Sulfate (Morphine 2 Mg/Ml Syringe)  2 mg IVPUSH Q2H PRN


   PRN Reason: Pain (severe 7-10)


Nystatin (Nystatin Susp 100,000 Unit/Ml 5 Ml Ud Cup)  5 ml PO QID [facility]


   Last Admin: 04/16/21 09:49 Dose:  5 ml


   Documented by: 


Ondansetron HCl (Ondansetron 4 Mg/2 Ml Sdv)  4 mg IV Q4H PRN


   PRN Reason: Nausea/Vomiting


Pantoprazole Sodium ([facility] 40 Mg Tab.Cr)  [facility]


   Last Admin: 04/16/21 07:11 Dose:  40 mg


   Documented by: 


Sodium Chloride (Sodium Chloride 0.9% 10 Ml Syringe)  10 ml FLUSH ASDIRECTED PRN


   PRN Reason: Keep Vein Open


Sotalol HCl (Sotalol 80 Mg Tab)  40 mg PO BID [facility]


   Last Admin: 04/16/21 09:46 Dose:  40 mg


   Documented by: 





Discontinued Medications





Bisacodyl (Bisacodyl 5 Mg Tab)  10 mg PO ONETIME ONE


   Stop: 04/14/21 00:09


   Last Admin: 04/14/21 02:05 Dose:  10 mg


   Documented by: 


Fentanyl (Fentanyl 100 Mcg/2 Ml Sdv) Confirm Administered Dose 100 mcg .ROUTE 

.STK-MED ONE


   Stop: 04/14/21 11:45


Pantoprazole Sodium 80 mg/ (Sodium Chloride)  100 mls @ 200 mls/hr IV ONETIME 

ONE


   Stop: 04/14/21 00:10


   Last Admin: 04/14/21 00:53 Dose:  200 mls/hr


   Documented by: 


Pantoprazole Sodium 80 mg/ (Sodium Chloride)  100 mls @ 10 mls/hr IV .Q10H [facility]


   Stop: 04/14/21 12:00


   Last Admin: 04/14/21 13:13 Dose:  Not Given


   Documented by: 


Sodium Chloride (Normal Saline)  1,000 mls @ 125 mls/hr IV ASDIRECTED [facility]


   Last Admin: 04/14/21 09:21 Dose:  125 mls/hr


   Documented by: 


Pantoprazole Sodium 80 mg/ (Sodium Chloride)  100 mls @ 10 mls/hr IV .Q10H [facility]


   Last Admin: 04/15/21 01:02 Dose:  10 mls/hr


   Documented by: 


Lactated Ringer's (Ringers, Lactated) Confirm Administered Dose 1,000 mls @ as 

directed .ROUTE .STK-MED ONE


   Stop: 04/14/21 15:07


Sodium Chloride (Normal Saline)  70 mls @ 3 mls/sec IV ASDIRECTED STA


   Stop: 04/15/21 04:46


   Last Admin: 04/15/21 05:15 Dose:  3 mls/sec


   Documented by: 


Iohexol (Iohexol 647 Mg/Ml 50 Ml Sdv)  50 ml PO .ASDIRECTED [facility]


   Stop: 04/15/21 12:00


Iopamidol (Iopamidol 612 Mg/Ml 50 Ml Sdv) Confirm Administered Dose 50 ml .ROUTE

.STK-MED ONE


   Stop: 04/15/21 03:35


   Last Admin: 04/15/21 03:43 Dose:  50 ml


   Documented by: 


Iopamidol (Iopamidol 612 Mg/Ml 100 Ml Bottle)  100 ml IV .AS DIRECTED STA


   Stop: 04/15/21 04:45


   Last Admin: 04/15/21 05:02 Dose:  Not Given


   Documented by: 


Iopamidol (Iopamidol 612 Mg/Ml 100 Ml Bottle)  100 ml IV .AS DIRECTED STA


   Stop: 04/15/21 05:15


   Last Admin: 04/15/21 05:17 Dose:  100 ml


   Documented by: 


Polyethylene Glycol (Polyethylene Glycol 3350 Powder 238 Gm Bot)  238 gm PO 

ONETIME ONE


   Stop: 04/14/21 00:07


   Last Admin: 04/14/21 00:24 Dose:  238 gram


   Documented by: 


Potassium Chloride (Potassium Chloride 20 Meq Tab.Er)  40 meq PO ONETIME ONE


   Stop: 04/15/21 08:01


   Last Admin: 04/15/21 08:37 Dose:  40 meq


   Documented by: 


Potassium Chloride (Potassium Chloride 20 Meq Tab.Er)  40 meq PO ONETIME ONE


   Stop: 04/15/21 17:01


   Last Admin: 04/15/21 16:23 Dose:  40 meq


   Documented by: 


Propofol (Propofol 200 Mg/20 Ml Sdv) Confirm Administered Dose 200 mg .ROUTE 

.STK-MED ONE


   Stop: 04/14/21 11:45


Propofol (Propofol 200 Mg/20 Ml Sdv) Confirm Administered Dose 200 mg .ROUTE 

.STK-MED ONE


   Stop: 04/14/21 14:59


Sodium Chloride (Sodium Chloride 0.9% 10 Ml Syringe)  10 ml FLUSH ASDIRECTED PRN


   PRN Reason: Keep Vein Open


   Last Admin: 04/13/21 21:37 Dose:  10 ml


   Documented by: 











- Exam


General: Reports: Alert, Oriented, Cooperative, No Acute Distress


Lungs: Reports: Clear to Auscultation, Normal Respiratory Effort


Cardiovascular: Reports: Regular Rate, Regular Rhythm, No Murmurs


GI/Abdominal Exam: Soft, Non-Tender, No Organomegaly, No Distention


Back Exam: Reports: Normal Inspection, Full Range of Motion


Extremities: Non-Tender, No Pedal Edema

## 2021-04-19 NOTE — OR
DATE OF PROCEDURE:  04/14/2021

 

SURGEON:  Robbie Hernandes MD

 

PREOPERATIVE DIAGNOSIS:  Recent rectal bleeding.

 

POSTOPERATIVE DIAGNOSES:  Recent rectal bleeding with:

1. Esophagogastroduodenoscopy showing:

    a.     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

    b.     Blackened section of mucosa over the gastric antrum of uncertain nature.

2. Colonoscopy showing:

    a.     Diverticulosis and stricturing involving distal sigmoid colon preventing more

     proximal colon exam.

    b.     No evident bleeding or bleeding source identified.

 

OPERATIVE PROCEDURE:

1. Esophagogastroduodenoscopy with:

    a.     Antral biopsies for CLOtest.

    b.     Biopsies of blackened area of gastric antral mucosa.

2. Flexible sigmoidoscopy (incomplete colonoscopy).

 

ANESTHESIA:  IV sedation.

 

INDICATIONS FOR PROCEDURE:  This is an 85-year-old male presenting with some recent rectal

bleeding.  This was enough that he did require transfusion.  Plan is to proceed with an

upper and lower endoscopy.  Of note, he did have a colonoscopy in the last year which was

incomplete but had a barium enema on the same day which apart from the diverticular disease

did not show any evident pathology.  Potential risks including bleeding and perforation were

discussed, and the patient wishes to proceed.

 

DETAILS OF PROCEDURE:  The patient was taken to the operating room and placed in a left

lateral decubitus position.  IV sedation was administered, after which the upper GI

endoscope was passed orally through the length of the esophagus into the stomach with

retroflexion view of the fundus and thereafter through the pyloric channel to the junction

of the third and fourth portions of the duodenum.

 

Findings included some spotty fungal overgrowth within the body and distal esophagus.  There

was  minimal hiatal hernia and no significant inflammation at the esophagogastric junction.

No upward migration of the esophagogastric junction mucosal line.  Within the stomach, no

retained bile or other food was noted.  There was a somewhat peculiar appearing area in the

gastric antrum where the mucosa was irregularly somewhat blackened.  Grossly, this appeared

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

this was in the gastric antrum.  There were no erosions or ulcers seen in the mucosa, over

that area was  fairly soft and are not thickened.  The pyloric channel and visualized

portions of the duodenum were unremarkable.

 

At this point, biopsies were obtained from the antrum and sent for CLOtest for H pylori.

Multiple biopsies were obtained over the darkened area of the gastric antrum and sent for

histologic evaluation.  Minimal bleeding from the biopsy sites was seen and the procedure

was then concluded.

 

The patient was taken to the colonoscopy.  Initial digital rectal exam was performed and was

unremarkable.  Colonoscope was passed into the rectum with retroflexion revealing

uncomplicated hemorrhoidal columns.  Scope was then eventually passed to the level of the

distal sigmoid colon.  At that point, some stricturing related to the diverticular disease

was evident, and despite multiple attempts, the scope could not be passed proximal to about

30 cm from the dentate line.  Scope was then withdrawn and the procedure was then concluded.

There was no blood or bleeding seen on either of the exam, although with the patient's

history, it certainly would be consistent with possible bleeding from diverticular disease.

The patient was taken to the recovery room in satisfactory condition.

 

Plan at this point will be to restart a full liquid diet and his cardiovascular medications.

We will obtain a CT scan of the abdomen with IV and oral contrast today to look for any

possible bleeding source and also evaluate the celiac and mesenteric vasculature.  We will

start him on Diflucan 200 mg a day and recheck some labs in the morning.  We will run a BNP

today as well as tomorrow morning to get some idea whether or not there is a significant

element of fluid overload either acutely or chronically.

 

 

 

 

Robbie Hernandes MD

DD:  04/18/2021 17:53:04

DT:  04/19/2021 12:35:07

Job #:  [id number]

## 2021-07-19 ENCOUNTER — HOSPITAL ENCOUNTER (EMERGENCY)
Dept: HOSPITAL 11 - JP.ED | Age: 86
LOS: 1 days | Discharge: HOME | End: 2021-07-20
Payer: MEDICARE

## 2021-07-19 DIAGNOSIS — E11.9: ICD-10-CM

## 2021-07-19 DIAGNOSIS — Z79.01: ICD-10-CM

## 2021-07-19 DIAGNOSIS — Z88.8: ICD-10-CM

## 2021-07-19 DIAGNOSIS — I25.10: ICD-10-CM

## 2021-07-19 DIAGNOSIS — Z79.84: ICD-10-CM

## 2021-07-19 DIAGNOSIS — J44.9: ICD-10-CM

## 2021-07-19 DIAGNOSIS — E83.42: Primary | ICD-10-CM

## 2021-07-19 DIAGNOSIS — M19.90: ICD-10-CM

## 2021-07-19 DIAGNOSIS — Z87.891: ICD-10-CM

## 2021-07-19 DIAGNOSIS — I50.9: ICD-10-CM

## 2021-07-19 DIAGNOSIS — Z88.6: ICD-10-CM

## 2021-07-19 DIAGNOSIS — Z79.899: ICD-10-CM

## 2021-07-19 DIAGNOSIS — K21.9: ICD-10-CM

## 2021-07-19 DIAGNOSIS — I11.0: ICD-10-CM

## 2021-07-19 DIAGNOSIS — E78.00: ICD-10-CM

## 2021-07-19 DIAGNOSIS — Z79.82: ICD-10-CM

## 2021-07-19 PROCEDURE — 85025 COMPLETE CBC W/AUTO DIFF WBC: CPT

## 2021-07-19 PROCEDURE — 80048 BASIC METABOLIC PNL TOTAL CA: CPT

## 2021-07-19 PROCEDURE — 83735 ASSAY OF MAGNESIUM: CPT

## 2021-07-19 PROCEDURE — 96366 THER/PROPH/DIAG IV INF ADDON: CPT

## 2021-07-19 PROCEDURE — 87899 AGENT NOS ASSAY W/OPTIC: CPT

## 2021-07-19 PROCEDURE — 96365 THER/PROPH/DIAG IV INF INIT: CPT

## 2021-07-19 PROCEDURE — 87046 STOOL CULTR AEROBIC BACT EA: CPT

## 2021-07-19 PROCEDURE — 89055 LEUKOCYTE ASSESSMENT FECAL: CPT

## 2021-07-19 PROCEDURE — 99283 EMERGENCY DEPT VISIT LOW MDM: CPT

## 2021-07-19 PROCEDURE — 36415 COLL VENOUS BLD VENIPUNCTURE: CPT

## 2021-07-20 VITALS — SYSTOLIC BLOOD PRESSURE: 125 MMHG | DIASTOLIC BLOOD PRESSURE: 60 MMHG | HEART RATE: 70 BPM

## 2021-07-20 NOTE — EDM.PDOC
ED HPI GENERAL MEDICAL PROBLEM





- General


Chief Complaint: General


Stated Complaint: LOW BLOOD LEVEL PER [facility] REF


Time Seen by Provider: 21 00:20


Source of Information: Reports: Patient, RN Notes Reviewed


History Limitations: Reports: No Limitations





- History of Present Illness


INITIAL COMMENTS - FREE TEXT/NARRATIVE: 





86-year-old gentleman presents emergency department today concerned about 

magnesium, he has a known history of hypomagnesemia did have his magnesium 

checked this morning around 800 was contacted this evening told to report to the

nearest emergency department as his magnesium was 1.1 he is asymptomatic at this

time





- Related Data


                                    Allergies











Allergy/AdvReac Type Severity Reaction Status Date / Time


 


naproxen Allergy Unknown Other Verified 21 23:24


 


naproxen sodium [From Aleve] Allergy Unknown Cannot Verified 21 23:24





   Remember  


 


lisinopril Allergy  Swelling Verified 21 23:24











Home Meds: 


                                    Home Meds





Aspirin 81 mg PO BEDTIME 14 [History]


Calcium Carbonate 1,500 mg PO BIDM 14 [History]


Cholecalciferol (Vitamin D3) [Vitamin D3] 2,000 unit PO DAILY 14 [History]


Fluticasone Propionate [Flonase] 2 sprays [facility] BID 14 [History]


Metoprolol Succinate [Toprol XL] 12.5 mg PO DAILY 14 [History]


Omeprazole [Prilosec] 40 mg PO BID 14 [History]


atorvaSTATin [Lipitor] 40 mg PO BEDTIME 14 [History]


Isosorbide Mononitrate [Imdur] 30 mg PO DAILY 18 [History]


amLODIPine Besylate [Norvasc] 10 mg PO DAILY 18 [History]


hydrALAZINE [Apresoline] 50 mg PO TID 18 [History]


Albuterol Sulfate [Proair Hfa] 2 puff [facility] BID 10/02/20 [History]


Sotalol [Betapace] 40 mg PO BID 10/02/20 [History]


traMADol [Ultram] 50 mg PO Q8HR PRN 10/02/20 [History]


Apixaban [Eliquis] 5 mg PO BID 10/05/20 [History]


Ferrous Sulfate 325 mg PO ASDIRECTED 21 [History]


metFORMIN [Glucophage XR] 1,000 mg PO BIDMEALS 21 [History]


Magnesium Oxide 400 mg PO DAILY #30 tab 21 [Rx]











Past Medical History


HEENT History: Reports: Cataract, Hard of Hearing


Cardiovascular History: Reports: Arrhythmia, CAD, Heart Failure, High 

Cholesterol, Hypertension, PVD, SOB on Exertion


Respiratory History: Reports: COPD


Gastrointestinal History: Reports: GERD


Genitourinary History: Reports: Prostate Disorder


Musculoskeletal History: Reports: Arthritis, Back Pain, Chronic


Neurological History: Reports: TIA


Psychiatric History: Reports: Depression


Endocrine/Metabolic History: Reports: Diabetes, Type II


Hematologic History: Reports: Iron Deficiency


Oncologic (Cancer) History: Reports: Prostate


Dermatologic History: Reports: Other (See Below)


Other Dermatologic History: minor skin cancer behind right ear





- Infectious Disease History


Infectious Disease History: Reports: Chicken Pox, Measles, Mumps, Novel 

Coronavirus


Other Infectious Disease History: history of ecoli





- Past Surgical History


HEENT Surgical History: Reports: Detached Retina


Cardiovascular Surgical History: Reports: None


Respiratory Surgical History: Reports: None


GI Surgical History: Reports: Colonoscopy, EGD, Hernia, Abdominal, Hernia, 

Inguinal


Male  Surgical History: Reports: Prostate Biopsy, Other (See Below)


Other Male  Surgeries/Procedures: 7 days of radiation


Endocrine Surgical History: Reports: None


Neurological Surgical History: Reports: None


Musculoskeletal Surgical History: Reports: Other (See Below)


Other Musculoskeletal Surgeries/Procedures:: Right hip surgery in 2018 repair 

fracture after fall


Oncologic Surgical History: Reports: None





Social & Family History





- Family History


Family Medical History: No Pertinent Family History





- Tobacco Use


Tobacco Use Status *Q: Former Tobacco User


Used Tobacco, but Quit: Yes


Month/Year Tobacco Last Used: 





- Caffeine Use


Caffeine Use: Reports: Coffee


Other Caffeine Use: 2 cups daily


Caffeine Use Comment: 2 cups





- Alcohol Use


Days Per Week of Alcohol Use: 7


Number of Drinks Per Day: 1


Total Drinks Per Week: 7





- Recreational Drug Use


Recreational Drug Use: No





- Living Situation & Occupation


Living situation: Reports:  (reports very active, just got home from 

[place] last week. Wife  2017, Daughter  of a heart attack 2021. has one Son who lives out of [facility]. .)





ED ROS GENERAL





- Review of Systems


Review Of Systems: See Below


Constitutional: Reports: No Symptoms


Respiratory: Reports: No Symptoms


Cardiovascular: Reports: No Symptoms


GI/Abdominal: Reports: No Symptoms





ED EXAM, GENERAL





- Physical Exam


Exam: See Below


Exam Limited By: No Limitations


General Appearance: Alert, WD/WN, No Apparent Distress


Respiratory/Chest: No Respiratory Distress, Lungs Clear, Normal Breath Sounds, 

No Accessory Muscle Use, Chest Non-Tender


Cardiovascular: Regular Rate, Rhythm, No Murmur





Course





- Vital Signs


Last Recorded V/S: 


                                Last Vital Signs











Temp  97.8 F   21 23:34


 


Pulse  66   21 23:45


 


Resp  18   21 23:45


 


BP  138/63   21 23:45


 


Pulse Ox  97   21 23:45














- Orders/Labs/Meds


Orders: 


                               Active Orders 24 hr











 Category Date Time Status


 


 Peripheral IV Care [RC] .AS DIRECTED Care  21 00:23 Active


 


 CULTURE STOOL + SHIGATOX [RM] Stat Lab  21 01:35 Received


 


 Lactated Ringers [Ringers, Lactated] 1,000 ml Med  21 00:30 Active





 IV ASDIRECTED   


 


 Magnesium Sulfate/Water [Magnesium Sulfate in Water 2 Med  21 01:42 

Active





 GM/50 ML] 2 gm   





 Premix Bag 1 bag   





 IV ONETIME   


 


 Sodium Chloride 0.9% [Saline Flush] Med  21 00:23 Active





 10 ml FLUSH ASDIRECTED PRN   


 


 Peripheral IV Insertion Adult [OM.PC] Urgent Oth  21 00:22 Ordered








                                Medication Orders





Lactated Ringer's (Ringers, Lactated)  1,000 mls @ 100 mls/hr IV ASDIRECTED [facility]


   Last Admin: 21 00:48  Dose: 100 mls/hr


   Documented by: BRIE


Magnesium Sulfate 2 gm/ Premix  50 mls @ 25 mls/hr IV ONETIME ONE


   Stop: 21 03:41


   Last Admin: 21 02:10  Dose: 25 mls/hr


   Documented by: LACHELLE


Sodium Chloride (Sodium Chloride 0.9% 10 Ml Syringe)  10 ml FLUSH ASDIRECTED PRN


   PRN Reason: Keep Vein Open


   Last Admin: 21 00:48  Dose: 10 ml


   Documented by: LACHELLE








Labs: 


                                Laboratory Tests











  21 Range/Units





  00:25 00:25 00:25 


 


WBC  7.3    (4.5-11.0)  K/uL


 


RBC  4.07 L    (4.30-5.90)  M/uL


 


Hgb  11.8 L    (12.0-15.0)  g/dL


 


Hct  35.4 L    (40.0-54.0)  %


 


MCV  87    (80-98)  fL


 


MCH  29    (27-31)  pg


 


MCHC  33    (32-36)  %


 


Plt Count  203    (150-400)  K/uL


 


Neut % (Auto)  60.9    (36-66)  %


 


Lymph % (Auto)  21.1 L    (24-44)  %


 


Mono % (Auto)  14.6 H    (2-6)  %


 


Eos % (Auto)  2.9    (2-4)  %


 


Baso % (Auto)  0.5    (0-1)  %


 


Sodium   130 L   (140-148)  mmol/L


 


Potassium   4.6   (3.6-5.2)  mmol/L


 


Chloride   95 L   (100-108)  mmol/L


 


Carbon Dioxide   24   (21-32)  mmol/L


 


Anion Gap   15.6 H   (5.0-14.0)  mmol/L


 


BUN   18  D   (7-18)  mg/dL


 


Creatinine   0.8   (0.8-1.3)  mg/dL


 


Est Cr Clr Drug Dosing   56.25   mL/min


 


Estimated GFR (MDRD)   > 60   (>60)  


 


Glucose   108 H   ()  mg/dL


 


Calcium   9.0   (8.5-10.1)  mg/dL


 


Magnesium    1.0 L D  (1.8-2.4)  mg/dL











Meds: 


Medications











Generic Name Dose Route Start Last Admin





  Trade Name Freq  PRN Reason Stop Dose Admin


 


Lactated Ringer's  1,000 mls @ 100 mls/hr  21 00:30  21 00:48





  Ringers, Lactated  IV   100 mls/hr





  ASDIRECTED ZELALEM   Administration


 


Magnesium Sulfate 2 gm/ Premix  50 mls @ 25 mls/hr  21 01:42  21 

02:10





  IV  21 03:41  25 mls/hr





  ONETIME ONE   Administration


 


Sodium Chloride  10 ml  21 00:23  21 00:48





  Sodium Chloride 0.9% 10 Ml Syringe  FLUSH   10 ml





  ASDIRECTED PRN   Administration





  Keep Vein Open  














Discontinued Medications














Generic Name Dose Route Start Last Admin





  Trade Name Haley  PRN Reason Stop Dose Admin


 


Magnesium Oxide  800 mg  21 01:43  21 02:10





  Magnesium Oxide 400 Mg Tab  PO  21 01:44  800 mg





  ONETIME ONE   Administration














Departure





- Departure


Time of Disposition: 03:38


Disposition: Home, Self-Care 01


Condition: Fair


Clinical Impression: 


 Hypomagnesemia








- Discharge Information


Prescriptions: 


Magnesium Oxide 400 mg PO DAILY #30 tab


Instructions:  Hypomagnesemia


Referrals: 


Mike Montenegro MD [Primary Care Provider] - 


Forms:  ED Department Discharge


Additional Instructions: 


Your magnesium replacement has been faxed to [facility], please keep 

your follow-up appointment with your primary care next week for further 

evaluation call return to the emergency department worsening of symptoms





Sepsis Event Note (ED)





- Evaluation


Sepsis Screening Result: No Definite Risk





- Focused Exam


Vital Signs: 


                                   Vital Signs











  Temp Pulse Resp BP Pulse Ox


 


 21 23:45   66  18  138/63  97


 


 21 23:34  97.8 F  67  16  163/64 H  96


 


 21 23:05  97.8 F  67  16  163/64 H  96














- My Orders


Last 24 Hours: 


My Active Orders





21 00:22


Peripheral IV Insertion Adult [OM.PC] Urgent 





21 00:23


Peripheral IV Care [RC] .AS DIRECTED 


Sodium Chloride 0.9% [Saline Flush]   10 ml FLUSH ASDIRECTED PRN 





21 00:30


Lactated Ringers [Ringers, Lactated] 1,000 ml IV ASDIRECTED 





21 01:35


CULTURE STOOL + SHIGATOX [RM] Stat 





21 01:42


Magnesium Sulfate/Water [Magnesium Sulfate in Water 2 GM/50 ML] 2 gm   Premix 

Bag 1 bag IV ONETIME 














- Assessment/Plan


Last 24 Hours: 


My Active Orders





21 00:22


Peripheral IV Insertion Adult [OM.PC] Urgent 





21 00:23


Peripheral IV Care [RC] .AS DIRECTED 


Sodium Chloride 0.9% [Saline Flush]   10 ml FLUSH ASDIRECTED PRN 





21 00:30


Lactated Ringers [Ringers, Lactated] 1,000 ml IV ASDIRECTED 





21 01:35


CULTURE STOOL + SHIGATOX [RM] Stat 





21 01:42


Magnesium Sulfate/Water [Magnesium Sulfate in Water 2 GM/50 ML] 2 gm   Premix 

Bag 1 bag IV ONETIME 











Plan: 





Assessment





Acuity = acute





Site and laterality = hypomagnesemia





Etiology  = unknown





Manifestations = none





Location of injury =  Home





Lab values = CBC unremarkable sodium low at 130 consistent hyponatremia 

magnesium low at 1.0 consistent with hypomagnesemia





Plan


Magnesium was replaced with 2 g IV and 800 mg orally prescription written for 

400 mg p.o. daily faxed to his pharmacy [facility] he has a follow-up 

appointment with his physician next week for reevaluation of hypomagnesia

















 This note was dictated using dragon voice recognition software please call with

any questions on syntax or grammar.

## 2022-06-28 ENCOUNTER — HOSPITAL ENCOUNTER (OUTPATIENT)
Dept: HOSPITAL 11 - JP.ED | Age: 87
Setting detail: OBSERVATION
LOS: 2 days | Discharge: HOME | End: 2022-06-30
Attending: INTERNAL MEDICINE | Admitting: INTERNAL MEDICINE
Payer: MEDICARE

## 2022-06-28 DIAGNOSIS — I25.10: ICD-10-CM

## 2022-06-28 DIAGNOSIS — J44.9: ICD-10-CM

## 2022-06-28 DIAGNOSIS — Z88.6: ICD-10-CM

## 2022-06-28 DIAGNOSIS — I48.11: ICD-10-CM

## 2022-06-28 DIAGNOSIS — Z86.73: ICD-10-CM

## 2022-06-28 DIAGNOSIS — E11.51: ICD-10-CM

## 2022-06-28 DIAGNOSIS — I50.9: ICD-10-CM

## 2022-06-28 DIAGNOSIS — F32.A: ICD-10-CM

## 2022-06-28 DIAGNOSIS — I11.0: ICD-10-CM

## 2022-06-28 DIAGNOSIS — Z79.51: ICD-10-CM

## 2022-06-28 DIAGNOSIS — Z79.899: ICD-10-CM

## 2022-06-28 DIAGNOSIS — Z88.8: ICD-10-CM

## 2022-06-28 DIAGNOSIS — K92.2: Primary | ICD-10-CM

## 2022-06-28 DIAGNOSIS — Z20.822: ICD-10-CM

## 2022-06-28 DIAGNOSIS — E11.59: ICD-10-CM

## 2022-06-28 DIAGNOSIS — Z79.82: ICD-10-CM

## 2022-06-28 LAB — SARS-COV-2 RNA RESP QL NAA+PROBE: NEGATIVE

## 2022-06-28 PROCEDURE — 99285 EMERGENCY DEPT VISIT HI MDM: CPT

## 2022-06-28 PROCEDURE — G0378 HOSPITAL OBSERVATION PER HR: HCPCS

## 2022-06-28 PROCEDURE — 0241U: CPT

## 2022-06-28 PROCEDURE — 85025 COMPLETE CBC W/AUTO DIFF WBC: CPT

## 2022-06-28 PROCEDURE — 82150 ASSAY OF AMYLASE: CPT

## 2022-06-28 PROCEDURE — 94640 AIRWAY INHALATION TREATMENT: CPT

## 2022-06-28 PROCEDURE — C9113 INJ PANTOPRAZOLE SODIUM, VIA: HCPCS

## 2022-06-28 PROCEDURE — 85018 HEMOGLOBIN: CPT

## 2022-06-28 PROCEDURE — 83735 ASSAY OF MAGNESIUM: CPT

## 2022-06-28 PROCEDURE — 82272 OCCULT BLD FECES 1-3 TESTS: CPT

## 2022-06-28 PROCEDURE — 99284 EMERGENCY DEPT VISIT MOD MDM: CPT

## 2022-06-28 PROCEDURE — 36415 COLL VENOUS BLD VENIPUNCTURE: CPT

## 2022-06-28 PROCEDURE — 80053 COMPREHEN METABOLIC PANEL: CPT

## 2022-06-28 PROCEDURE — 85610 PROTHROMBIN TIME: CPT

## 2022-06-28 PROCEDURE — 83690 ASSAY OF LIPASE: CPT

## 2022-06-28 PROCEDURE — 80048 BASIC METABOLIC PNL TOTAL CA: CPT

## 2022-06-28 PROCEDURE — 96374 THER/PROPH/DIAG INJ IV PUSH: CPT

## 2022-06-28 PROCEDURE — 74177 CT ABD & PELVIS W/CONTRAST: CPT

## 2022-06-28 PROCEDURE — 81001 URINALYSIS AUTO W/SCOPE: CPT

## 2022-06-28 PROCEDURE — 83605 ASSAY OF LACTIC ACID: CPT

## 2022-06-28 PROCEDURE — 96361 HYDRATE IV INFUSION ADD-ON: CPT

## 2022-06-28 PROCEDURE — 82947 ASSAY GLUCOSE BLOOD QUANT: CPT

## 2022-06-28 RX ADMIN — FLUTICASONE PROPIONATE SCH: 50 SPRAY, METERED NASAL at 22:04

## 2022-06-29 RX ADMIN — Medication SCH TAB: at 10:41

## 2022-06-29 RX ADMIN — FLUTICASONE PROPIONATE SCH: 50 SPRAY, METERED NASAL at 11:35

## 2022-06-29 RX ADMIN — FLUTICASONE PROPIONATE SCH: 50 SPRAY, METERED NASAL at 20:06

## 2022-06-30 VITALS — DIASTOLIC BLOOD PRESSURE: 85 MMHG | SYSTOLIC BLOOD PRESSURE: 138 MMHG | HEART RATE: 85 BPM

## 2022-06-30 RX ADMIN — FLUTICASONE PROPIONATE SCH: 50 SPRAY, METERED NASAL at 08:43

## 2022-06-30 RX ADMIN — Medication SCH TAB: at 08:36

## 2022-06-30 RX ADMIN — FLUTICASONE PROPIONATE SCH SPRAY: 50 SPRAY, METERED NASAL at 08:37

## 2022-07-24 ENCOUNTER — HOSPITAL ENCOUNTER (EMERGENCY)
Dept: HOSPITAL 11 - JP.ED | Age: 87
Discharge: HOME | End: 2022-07-24
Payer: MEDICARE

## 2022-07-24 VITALS — SYSTOLIC BLOOD PRESSURE: 147 MMHG | HEART RATE: 60 BPM | DIASTOLIC BLOOD PRESSURE: 68 MMHG

## 2022-07-24 DIAGNOSIS — J44.9: ICD-10-CM

## 2022-07-24 DIAGNOSIS — Z79.82: ICD-10-CM

## 2022-07-24 DIAGNOSIS — I50.9: ICD-10-CM

## 2022-07-24 DIAGNOSIS — I25.10: ICD-10-CM

## 2022-07-24 DIAGNOSIS — Z79.01: ICD-10-CM

## 2022-07-24 DIAGNOSIS — M19.071: Primary | ICD-10-CM

## 2022-07-24 DIAGNOSIS — Z86.16: ICD-10-CM

## 2022-07-24 DIAGNOSIS — I11.0: ICD-10-CM

## 2022-07-24 DIAGNOSIS — E11.9: ICD-10-CM

## 2022-07-24 DIAGNOSIS — Z88.8: ICD-10-CM

## 2022-07-24 DIAGNOSIS — Z79.899: ICD-10-CM

## 2022-07-28 ENCOUNTER — HOSPITAL ENCOUNTER (EMERGENCY)
Dept: HOSPITAL 11 - JP.ED | Age: 87
Discharge: HOME | End: 2022-07-28
Payer: MEDICARE

## 2022-07-28 VITALS — DIASTOLIC BLOOD PRESSURE: 63 MMHG | HEART RATE: 77 BPM | SYSTOLIC BLOOD PRESSURE: 144 MMHG

## 2022-07-28 DIAGNOSIS — Z79.01: ICD-10-CM

## 2022-07-28 DIAGNOSIS — E11.9: ICD-10-CM

## 2022-07-28 DIAGNOSIS — E11.65: Primary | ICD-10-CM

## 2022-07-28 DIAGNOSIS — Z79.82: ICD-10-CM

## 2022-07-28 DIAGNOSIS — Z86.16: ICD-10-CM

## 2022-07-28 DIAGNOSIS — Z79.899: ICD-10-CM

## 2022-07-28 DIAGNOSIS — I25.10: ICD-10-CM

## 2022-07-28 DIAGNOSIS — Z88.8: ICD-10-CM

## 2022-07-28 DIAGNOSIS — J44.9: ICD-10-CM

## 2022-07-28 DIAGNOSIS — I50.9: ICD-10-CM

## 2022-07-28 DIAGNOSIS — I11.0: ICD-10-CM

## 2022-07-28 LAB
B MICROTI IGG TITR SER: (no result) {TITER}
B MICROTI IGM TITR SER: (no result) {TITER}

## 2022-09-11 ENCOUNTER — HOSPITAL ENCOUNTER (EMERGENCY)
Dept: HOSPITAL 11 - JP.ED | Age: 87
Discharge: HOME | End: 2022-09-11
Payer: MEDICARE

## 2022-09-11 VITALS — HEART RATE: 81 BPM | SYSTOLIC BLOOD PRESSURE: 123 MMHG | DIASTOLIC BLOOD PRESSURE: 68 MMHG

## 2022-09-11 DIAGNOSIS — J44.9: ICD-10-CM

## 2022-09-11 DIAGNOSIS — Z87.891: ICD-10-CM

## 2022-09-11 DIAGNOSIS — Z79.01: ICD-10-CM

## 2022-09-11 DIAGNOSIS — I50.9: ICD-10-CM

## 2022-09-11 DIAGNOSIS — E11.9: ICD-10-CM

## 2022-09-11 DIAGNOSIS — Z86.16: ICD-10-CM

## 2022-09-11 DIAGNOSIS — Z79.899: ICD-10-CM

## 2022-09-11 DIAGNOSIS — Z88.8: ICD-10-CM

## 2022-09-11 DIAGNOSIS — S80.01XA: Primary | ICD-10-CM

## 2022-09-11 DIAGNOSIS — Z79.82: ICD-10-CM

## 2022-09-11 DIAGNOSIS — I25.10: ICD-10-CM

## 2022-09-11 DIAGNOSIS — I11.0: ICD-10-CM
